# Patient Record
Sex: FEMALE | Race: WHITE | NOT HISPANIC OR LATINO | Employment: OTHER | URBAN - METROPOLITAN AREA
[De-identification: names, ages, dates, MRNs, and addresses within clinical notes are randomized per-mention and may not be internally consistent; named-entity substitution may affect disease eponyms.]

---

## 2024-02-17 ENCOUNTER — HOSPITAL ENCOUNTER (INPATIENT)
Facility: HOSPITAL | Age: 84
LOS: 4 days | Discharge: HOME | DRG: 195 | End: 2024-02-21
Attending: EMERGENCY MEDICINE | Admitting: HOSPITALIST
Payer: MEDICARE

## 2024-02-17 ENCOUNTER — APPOINTMENT (OUTPATIENT)
Dept: RADIOLOGY | Facility: HOSPITAL | Age: 84
DRG: 195 | End: 2024-02-17
Payer: MEDICARE

## 2024-02-17 DIAGNOSIS — J18.9 PNEUMONIA OF RIGHT LOWER LOBE DUE TO INFECTIOUS ORGANISM: Primary | ICD-10-CM

## 2024-02-17 LAB
ALBUMIN SERPL BCP-MCNC: 3.9 G/DL (ref 3.4–5)
ALP SERPL-CCNC: 73 U/L (ref 33–136)
ALT SERPL W P-5'-P-CCNC: 11 U/L (ref 7–45)
ANION GAP SERPL CALC-SCNC: 14 MMOL/L (ref 10–20)
APPEARANCE UR: CLEAR
AST SERPL W P-5'-P-CCNC: 11 U/L (ref 9–39)
BASOPHILS # BLD AUTO: 0.08 X10*3/UL (ref 0–0.1)
BASOPHILS NFR BLD AUTO: 0.5 %
BILIRUB SERPL-MCNC: 0.4 MG/DL (ref 0–1.2)
BILIRUB UR STRIP.AUTO-MCNC: NEGATIVE MG/DL
BUN SERPL-MCNC: 20 MG/DL (ref 6–23)
CALCIUM SERPL-MCNC: 8.9 MG/DL (ref 8.6–10.3)
CHLORIDE SERPL-SCNC: 101 MMOL/L (ref 98–107)
CO2 SERPL-SCNC: 25 MMOL/L (ref 21–32)
COLOR UR: ABNORMAL
CREAT SERPL-MCNC: 1.03 MG/DL (ref 0.5–1.05)
EGFRCR SERPLBLD CKD-EPI 2021: 54 ML/MIN/1.73M*2
EOSINOPHIL # BLD AUTO: 0.22 X10*3/UL (ref 0–0.4)
EOSINOPHIL NFR BLD AUTO: 1.3 %
ERYTHROCYTE [DISTWIDTH] IN BLOOD BY AUTOMATED COUNT: 13.8 % (ref 11.5–14.5)
FLUAV RNA RESP QL NAA+PROBE: NOT DETECTED
FLUBV RNA RESP QL NAA+PROBE: NOT DETECTED
GLUCOSE SERPL-MCNC: 223 MG/DL (ref 74–99)
GLUCOSE UR STRIP.AUTO-MCNC: ABNORMAL MG/DL
HCT VFR BLD AUTO: 38.4 % (ref 36–46)
HGB BLD-MCNC: 12.4 G/DL (ref 12–16)
IMM GRANULOCYTES # BLD AUTO: 0.38 X10*3/UL (ref 0–0.5)
IMM GRANULOCYTES NFR BLD AUTO: 2.2 % (ref 0–0.9)
KETONES UR STRIP.AUTO-MCNC: ABNORMAL MG/DL
LEUKOCYTE ESTERASE UR QL STRIP.AUTO: NEGATIVE
LIPASE SERPL-CCNC: 53 U/L (ref 9–82)
LYMPHOCYTES # BLD AUTO: 0.92 X10*3/UL (ref 0.8–3)
LYMPHOCYTES NFR BLD AUTO: 5.4 %
MCH RBC QN AUTO: 28.7 PG (ref 26–34)
MCHC RBC AUTO-ENTMCNC: 32.3 G/DL (ref 32–36)
MCV RBC AUTO: 89 FL (ref 80–100)
MONOCYTES # BLD AUTO: 0.89 X10*3/UL (ref 0.05–0.8)
MONOCYTES NFR BLD AUTO: 5.2 %
NEUTROPHILS # BLD AUTO: 14.48 X10*3/UL (ref 1.6–5.5)
NEUTROPHILS NFR BLD AUTO: 85.4 %
NITRITE UR QL STRIP.AUTO: NEGATIVE
NRBC BLD-RTO: 0 /100 WBCS (ref 0–0)
PH UR STRIP.AUTO: 7 [PH]
PLATELET # BLD AUTO: 260 X10*3/UL (ref 150–450)
POTASSIUM SERPL-SCNC: 4 MMOL/L (ref 3.5–5.3)
PROT SERPL-MCNC: 6.6 G/DL (ref 6.4–8.2)
PROT UR STRIP.AUTO-MCNC: ABNORMAL MG/DL
RBC # BLD AUTO: 4.32 X10*6/UL (ref 4–5.2)
RBC # UR STRIP.AUTO: ABNORMAL /UL
RBC #/AREA URNS AUTO: NORMAL /HPF
RSV RNA RESP QL NAA+PROBE: NOT DETECTED
SARS-COV-2 RNA RESP QL NAA+PROBE: NOT DETECTED
SODIUM SERPL-SCNC: 136 MMOL/L (ref 136–145)
SP GR UR STRIP.AUTO: 1.04
UROBILINOGEN UR STRIP.AUTO-MCNC: <2 MG/DL
WBC # BLD AUTO: 17 X10*3/UL (ref 4.4–11.3)
WBC #/AREA URNS AUTO: NORMAL /HPF

## 2024-02-17 PROCEDURE — 80053 COMPREHEN METABOLIC PANEL: CPT | Performed by: EMERGENCY MEDICINE

## 2024-02-17 PROCEDURE — 87637 SARSCOV2&INF A&B&RSV AMP PRB: CPT | Performed by: EMERGENCY MEDICINE

## 2024-02-17 PROCEDURE — 96361 HYDRATE IV INFUSION ADD-ON: CPT

## 2024-02-17 PROCEDURE — 96365 THER/PROPH/DIAG IV INF INIT: CPT

## 2024-02-17 PROCEDURE — 36415 COLL VENOUS BLD VENIPUNCTURE: CPT | Performed by: EMERGENCY MEDICINE

## 2024-02-17 PROCEDURE — 74177 CT ABD & PELVIS W/CONTRAST: CPT | Mod: FOREIGN READ | Performed by: RADIOLOGY

## 2024-02-17 PROCEDURE — 94640 AIRWAY INHALATION TREATMENT: CPT

## 2024-02-17 PROCEDURE — 85025 COMPLETE CBC W/AUTO DIFF WBC: CPT | Performed by: EMERGENCY MEDICINE

## 2024-02-17 PROCEDURE — 99223 1ST HOSP IP/OBS HIGH 75: CPT | Performed by: HOSPITALIST

## 2024-02-17 PROCEDURE — 96367 TX/PROPH/DG ADDL SEQ IV INF: CPT

## 2024-02-17 PROCEDURE — 71045 X-RAY EXAM CHEST 1 VIEW: CPT

## 2024-02-17 PROCEDURE — 1200000002 HC GENERAL ROOM WITH TELEMETRY DAILY

## 2024-02-17 PROCEDURE — 96375 TX/PRO/DX INJ NEW DRUG ADDON: CPT

## 2024-02-17 PROCEDURE — 2500000002 HC RX 250 W HCPCS SELF ADMINISTERED DRUGS (ALT 637 FOR MEDICARE OP, ALT 636 FOR OP/ED): Performed by: EMERGENCY MEDICINE

## 2024-02-17 PROCEDURE — 71045 X-RAY EXAM CHEST 1 VIEW: CPT | Mod: FOREIGN READ | Performed by: RADIOLOGY

## 2024-02-17 PROCEDURE — 99285 EMERGENCY DEPT VISIT HI MDM: CPT | Mod: 25

## 2024-02-17 PROCEDURE — 2500000005 HC RX 250 GENERAL PHARMACY W/O HCPCS: Performed by: EMERGENCY MEDICINE

## 2024-02-17 PROCEDURE — 2550000001 HC RX 255 CONTRASTS: Performed by: EMERGENCY MEDICINE

## 2024-02-17 PROCEDURE — 83690 ASSAY OF LIPASE: CPT | Performed by: EMERGENCY MEDICINE

## 2024-02-17 PROCEDURE — 81001 URINALYSIS AUTO W/SCOPE: CPT | Performed by: EMERGENCY MEDICINE

## 2024-02-17 PROCEDURE — 74177 CT ABD & PELVIS W/CONTRAST: CPT

## 2024-02-17 PROCEDURE — 2500000004 HC RX 250 GENERAL PHARMACY W/ HCPCS (ALT 636 FOR OP/ED): Performed by: EMERGENCY MEDICINE

## 2024-02-17 RX ORDER — DEXTROSE MONOHYDRATE 100 MG/ML
0.3 INJECTION, SOLUTION INTRAVENOUS ONCE AS NEEDED
Status: DISCONTINUED | OUTPATIENT
Start: 2024-02-17 | End: 2024-02-21 | Stop reason: HOSPADM

## 2024-02-17 RX ORDER — FORMOTEROL FUMARATE DIHYDRATE 20 UG/2ML
20 SOLUTION RESPIRATORY (INHALATION)
Status: DISCONTINUED | OUTPATIENT
Start: 2024-02-17 | End: 2024-02-21 | Stop reason: HOSPADM

## 2024-02-17 RX ORDER — ONDANSETRON 4 MG/1
4 TABLET, ORALLY DISINTEGRATING ORAL EVERY 8 HOURS PRN
Status: DISCONTINUED | OUTPATIENT
Start: 2024-02-17 | End: 2024-02-21 | Stop reason: HOSPADM

## 2024-02-17 RX ORDER — LANOLIN ALCOHOL/MO/W.PET/CERES
1000 CREAM (GRAM) TOPICAL DAILY
Status: DISCONTINUED | OUTPATIENT
Start: 2024-02-18 | End: 2024-02-21 | Stop reason: HOSPADM

## 2024-02-17 RX ORDER — ATORVASTATIN CALCIUM 40 MG/1
40 TABLET, FILM COATED ORAL NIGHTLY
Status: DISCONTINUED | OUTPATIENT
Start: 2024-02-17 | End: 2024-02-21 | Stop reason: HOSPADM

## 2024-02-17 RX ORDER — IPRATROPIUM BROMIDE AND ALBUTEROL SULFATE 2.5; .5 MG/3ML; MG/3ML
3 SOLUTION RESPIRATORY (INHALATION) EVERY 2 HOUR PRN
Status: DISCONTINUED | OUTPATIENT
Start: 2024-02-17 | End: 2024-02-21 | Stop reason: HOSPADM

## 2024-02-17 RX ORDER — POLYETHYLENE GLYCOL 3350 17 G/17G
17 POWDER, FOR SOLUTION ORAL DAILY
Status: DISCONTINUED | OUTPATIENT
Start: 2024-02-18 | End: 2024-02-21 | Stop reason: HOSPADM

## 2024-02-17 RX ORDER — METOPROLOL TARTRATE 25 MG/1
25 TABLET, FILM COATED ORAL 2 TIMES DAILY
Status: DISCONTINUED | OUTPATIENT
Start: 2024-02-17 | End: 2024-02-21 | Stop reason: HOSPADM

## 2024-02-17 RX ORDER — GUAIFENESIN 100 MG/5ML
200 SOLUTION ORAL EVERY 4 HOURS PRN
Status: DISCONTINUED | OUTPATIENT
Start: 2024-02-17 | End: 2024-02-21 | Stop reason: HOSPADM

## 2024-02-17 RX ORDER — AMLODIPINE BESYLATE 5 MG/1
5 TABLET ORAL DAILY
Status: DISCONTINUED | OUTPATIENT
Start: 2024-02-18 | End: 2024-02-19

## 2024-02-17 RX ORDER — IPRATROPIUM BROMIDE AND ALBUTEROL SULFATE 2.5; .5 MG/3ML; MG/3ML
3 SOLUTION RESPIRATORY (INHALATION)
Status: DISCONTINUED | OUTPATIENT
Start: 2024-02-17 | End: 2024-02-17

## 2024-02-17 RX ORDER — LORAZEPAM 1 MG/1
1 TABLET ORAL ONCE
Status: COMPLETED | OUTPATIENT
Start: 2024-02-17 | End: 2024-02-18

## 2024-02-17 RX ORDER — ONDANSETRON HYDROCHLORIDE 2 MG/ML
4 INJECTION, SOLUTION INTRAVENOUS EVERY 8 HOURS PRN
Status: DISCONTINUED | OUTPATIENT
Start: 2024-02-17 | End: 2024-02-21 | Stop reason: HOSPADM

## 2024-02-17 RX ORDER — ACETAMINOPHEN 325 MG/1
650 TABLET ORAL EVERY 4 HOURS PRN
Status: DISCONTINUED | OUTPATIENT
Start: 2024-02-17 | End: 2024-02-21 | Stop reason: HOSPADM

## 2024-02-17 RX ORDER — FLUTICASONE FUROATE AND VILANTEROL 200; 25 UG/1; UG/1
1 POWDER RESPIRATORY (INHALATION) DAILY
Status: DISCONTINUED | OUTPATIENT
Start: 2024-02-18 | End: 2024-02-17

## 2024-02-17 RX ORDER — CEFTRIAXONE 1 G/50ML
1 INJECTION, SOLUTION INTRAVENOUS EVERY 24 HOURS
Status: DISCONTINUED | OUTPATIENT
Start: 2024-02-18 | End: 2024-02-19

## 2024-02-17 RX ORDER — DEXTROSE 50 % IN WATER (D50W) INTRAVENOUS SYRINGE
25
Status: DISCONTINUED | OUTPATIENT
Start: 2024-02-17 | End: 2024-02-21 | Stop reason: HOSPADM

## 2024-02-17 RX ORDER — ESCITALOPRAM OXALATE 20 MG/1
20 TABLET ORAL DAILY
Status: DISCONTINUED | OUTPATIENT
Start: 2024-02-18 | End: 2024-02-21 | Stop reason: HOSPADM

## 2024-02-17 RX ORDER — LOSARTAN POTASSIUM 50 MG/1
100 TABLET ORAL DAILY
Status: DISCONTINUED | OUTPATIENT
Start: 2024-02-18 | End: 2024-02-21 | Stop reason: HOSPADM

## 2024-02-17 RX ORDER — TALC
3 POWDER (GRAM) TOPICAL DAILY
Status: DISCONTINUED | OUTPATIENT
Start: 2024-02-17 | End: 2024-02-21 | Stop reason: HOSPADM

## 2024-02-17 RX ORDER — INSULIN LISPRO 100 [IU]/ML
0-10 INJECTION, SOLUTION INTRAVENOUS; SUBCUTANEOUS
Status: DISCONTINUED | OUTPATIENT
Start: 2024-02-18 | End: 2024-02-21 | Stop reason: HOSPADM

## 2024-02-17 RX ORDER — FLUTICASONE FUROATE AND VILANTEROL 200; 25 UG/1; UG/1
1 POWDER RESPIRATORY (INHALATION)
Status: DISCONTINUED | OUTPATIENT
Start: 2024-02-18 | End: 2024-02-17

## 2024-02-17 RX ORDER — ONDANSETRON HYDROCHLORIDE 2 MG/ML
4 INJECTION, SOLUTION INTRAVENOUS ONCE
Status: COMPLETED | OUTPATIENT
Start: 2024-02-17 | End: 2024-02-17

## 2024-02-17 RX ORDER — CEFTRIAXONE 1 G/50ML
1 INJECTION, SOLUTION INTRAVENOUS EVERY 24 HOURS
Status: DISCONTINUED | OUTPATIENT
Start: 2024-02-18 | End: 2024-02-17

## 2024-02-17 RX ORDER — BUDESONIDE 0.5 MG/2ML
0.5 INHALANT ORAL
Status: DISCONTINUED | OUTPATIENT
Start: 2024-02-17 | End: 2024-02-21 | Stop reason: HOSPADM

## 2024-02-17 RX ORDER — SODIUM CHLORIDE 9 MG/ML
75 INJECTION, SOLUTION INTRAVENOUS CONTINUOUS
Status: DISCONTINUED | OUTPATIENT
Start: 2024-02-17 | End: 2024-02-19

## 2024-02-17 RX ADMIN — HYDROMORPHONE HYDROCHLORIDE 0.5 MG: 1 INJECTION, SOLUTION INTRAMUSCULAR; INTRAVENOUS; SUBCUTANEOUS at 16:11

## 2024-02-17 RX ADMIN — AZITHROMYCIN MONOHYDRATE 500 MG: 500 INJECTION, POWDER, LYOPHILIZED, FOR SOLUTION INTRAVENOUS at 19:38

## 2024-02-17 RX ADMIN — SODIUM CHLORIDE, POTASSIUM CHLORIDE, SODIUM LACTATE AND CALCIUM CHLORIDE 1000 ML: 600; 310; 30; 20 INJECTION, SOLUTION INTRAVENOUS at 12:17

## 2024-02-17 RX ADMIN — CEFTRIAXONE 2 G: 2 INJECTION, POWDER, FOR SOLUTION INTRAMUSCULAR; INTRAVENOUS at 18:57

## 2024-02-17 RX ADMIN — IPRATROPIUM BROMIDE AND ALBUTEROL SULFATE 3 ML: 2.5; .5 SOLUTION RESPIRATORY (INHALATION) at 18:54

## 2024-02-17 RX ADMIN — IOHEXOL 75 ML: 350 INJECTION, SOLUTION INTRAVENOUS at 14:15

## 2024-02-17 RX ADMIN — ONDANSETRON 4 MG: 2 INJECTION INTRAMUSCULAR; INTRAVENOUS at 12:18

## 2024-02-17 RX ADMIN — Medication 2 L/MIN: at 18:54

## 2024-02-17 ASSESSMENT — ENCOUNTER SYMPTOMS
RHINORRHEA: 1
CONSTITUTIONAL NEGATIVE: 1
CARDIOVASCULAR NEGATIVE: 1
MUSCULOSKELETAL NEGATIVE: 1
ALLERGIC/IMMUNOLOGIC NEGATIVE: 1
PSYCHIATRIC NEGATIVE: 1
FREQUENCY: 0
HEMATOLOGIC/LYMPHATIC NEGATIVE: 1
EYES NEGATIVE: 1
COUGH: 1
SINUS PRESSURE: 1
DYSURIA: 0
NAUSEA: 1
VOMITING: 1
NEUROLOGICAL NEGATIVE: 1

## 2024-02-17 ASSESSMENT — COLUMBIA-SUICIDE SEVERITY RATING SCALE - C-SSRS
2. HAVE YOU ACTUALLY HAD ANY THOUGHTS OF KILLING YOURSELF?: NO
6. HAVE YOU EVER DONE ANYTHING, STARTED TO DO ANYTHING, OR PREPARED TO DO ANYTHING TO END YOUR LIFE?: NO
1. IN THE PAST MONTH, HAVE YOU WISHED YOU WERE DEAD OR WISHED YOU COULD GO TO SLEEP AND NOT WAKE UP?: NO

## 2024-02-17 ASSESSMENT — PAIN SCALES - GENERAL: PAINLEVEL_OUTOF10: 0 - NO PAIN

## 2024-02-17 ASSESSMENT — PAIN - FUNCTIONAL ASSESSMENT: PAIN_FUNCTIONAL_ASSESSMENT: 0-10

## 2024-02-17 NOTE — ED PROVIDER NOTES
HPI   Chief Complaint   Patient presents with    Flu Symptoms       The patient is an 83 year old female PMHx diverticulitis presenting with cough, abdominal pain, nausea, subjective fevers/ chills. Notes symptoms over the last 2-3 days. Notes no sick contacts. No recent travel. Symptoms not improved or worsened by any notable factor. Notes generalized body aches and fatigue as well. Notes a history of diverticulitis with similar symptoms in the past. Denies any vomiting, diarrhea, dark black or bloody stools.                           Hope Coma Scale Score: 15                     Patient History   Past Medical History:   Diagnosis Date    Disease of tongue, unspecified     Disorder of tongue    Diverticulitis of intestine, part unspecified, without perforation or abscess without bleeding 12/01/2016    Acute diverticulitis    Encounter for follow-up examination after completed treatment for malignant neoplasm 04/30/2015    Encounter for follow-up surveillance of breast cancer    Malignant neoplasm of lower-outer quadrant of unspecified female breast (CMS/HCC) 04/30/2019    Malignant neoplasm of lower-outer quadrant of female breast    Other conditions influencing health status     Malignant Female Breast Neoplasm Of The Upper Outer Quadrant    Personal history of diseases of the blood and blood-forming organs and certain disorders involving the immune mechanism 02/05/2015    History of iron deficiency anemia    Personal history of diseases of the skin and subcutaneous tissue     History of cyst of breast    Personal history of other (healed) physical injury and trauma 02/18/2014    History of whiplash injury to neck    Personal history of other diseases of the respiratory system 05/11/2015    History of sinusitis    Personal history of other endocrine, nutritional and metabolic disease     History of diabetes mellitus     Past Surgical History:   Procedure Laterality Date    BREAST LUMPECTOMY  08/28/2013    Left  Breast Lumpectomy    CATARACT EXTRACTION  02/18/2014    Cataract Extraction    GALLBLADDER SURGERY  11/04/2013    Gallbladder Surgery    OTHER SURGICAL HISTORY  08/28/2013    Bx Breast Percutan Needle Core Use Imag Guide (Stereotactic)    OTHER SURGICAL HISTORY  09/18/2014    Oophorectomy For Ectopic Pregnancy Right Ovary     No family history on file.  Social History     Tobacco Use    Smoking status: Not on file    Smokeless tobacco: Not on file   Substance Use Topics    Alcohol use: Not on file    Drug use: Not on file       Physical Exam   ED Triage Vitals   Temperature Heart Rate Respirations BP   02/17/24 1127 02/17/24 1127 02/17/24 1127 02/17/24 1127   36.9 °C (98.4 °F) 90 16 (!) 199/69      Pulse Ox Temp Source Heart Rate Source Patient Position   02/17/24 1127 02/17/24 1127 02/17/24 1151 --   95 % Oral Monitor       BP Location FiO2 (%)     -- --             Physical Exam  Constitutional:       General: She is not in acute distress.     Appearance: Normal appearance. She is ill-appearing. She is not toxic-appearing or diaphoretic.   HENT:      Head: Normocephalic.      Mouth/Throat:      Mouth: Mucous membranes are moist.      Tongue: No lesions. Tongue does not deviate from midline.      Palate: No mass and lesions.   Eyes:      General: Lids are normal. No visual field deficit.     Extraocular Movements: Extraocular movements intact.      Right eye: Normal extraocular motion and no nystagmus.      Left eye: Normal extraocular motion and no nystagmus.      Pupils: Pupils are equal, round, and reactive to light. Pupils are equal.   Cardiovascular:      Pulses:           Radial pulses are 2+ on the right side and 2+ on the left side.   Pulmonary:      Effort: Pulmonary effort is normal. No tachypnea or bradypnea.      Breath sounds: Normal air entry. Examination of the right-lower field reveals rhonchi. Examination of the left-lower field reveals rhonchi. Rhonchi present. No decreased breath sounds, wheezing  or rales.   Abdominal:      General: Abdomen is flat. Bowel sounds are normal.      Palpations: Abdomen is soft.      Tenderness: There is abdominal tenderness in the right lower quadrant, suprapubic area, left upper quadrant and left lower quadrant. There is no right CVA tenderness or left CVA tenderness.   Musculoskeletal:      Cervical back: Full passive range of motion without pain, normal range of motion and neck supple.   Neurological:      General: No focal deficit present.      Mental Status: She is alert.      GCS: GCS eye subscore is 4. GCS verbal subscore is 5. GCS motor subscore is 6.      Cranial Nerves: Cranial nerves 2-12 are intact. No cranial nerve deficit, dysarthria or facial asymmetry.      Sensory: Sensation is intact.      Motor: Motor function is intact.   Psychiatric:         Behavior: Behavior is cooperative.         ED Course & MDM   Diagnoses as of 02/24/24 2113   Pneumonia of right lower lobe due to infectious organism       Medical Decision Making  Patient is an 83 year old female presenting with generalized fatigue/ malaise/ cough with subjective fevers and chills over the last several days. Has a cough raising concern for respiratory infectious process however also with abdominal tenderness concerning for diverticulitis, colitis, hepatobiliary pathology. Will assess with labs, chest x-ray, CT abdomen and pelvis, will obtain UA to assess for renal pathology as well. Will treat with IV analgesics/ fluids. Disposition pending labs and imaging.    Workup remarkable for leukocytosis on CBC, CT showing no acute intraabdominal process but does have focal infiltrate of right lower lobe concerning for pneumonia. On reassessment patient mildly hypoxemic to 92% on RA, improved with 2L O2 by nasal cannula. Cultures obtained, IV abx initiated, patient admitted to hospitalist for further evaluation and management. Admitted in stable condition.        Procedure  Procedures     Malvin Connolly,  MD  02/24/24 7614

## 2024-02-17 NOTE — ED TRIAGE NOTES
Pt coming in today because she woke up not feeling feel. She is complaining of generalized weakness and being incredible nauseous with it. Vitals are stable and no pain at this time.

## 2024-02-18 LAB
ANION GAP SERPL CALC-SCNC: 12 MMOL/L (ref 10–20)
BASOPHILS # BLD AUTO: 0.06 X10*3/UL (ref 0–0.1)
BASOPHILS NFR BLD AUTO: 0.4 %
BUN SERPL-MCNC: 25 MG/DL (ref 6–23)
CALCIUM SERPL-MCNC: 7.8 MG/DL (ref 8.6–10.3)
CHLORIDE SERPL-SCNC: 102 MMOL/L (ref 98–107)
CO2 SERPL-SCNC: 26 MMOL/L (ref 21–32)
CREAT SERPL-MCNC: 1.24 MG/DL (ref 0.5–1.05)
EGFRCR SERPLBLD CKD-EPI 2021: 43 ML/MIN/1.73M*2
EOSINOPHIL # BLD AUTO: 0.07 X10*3/UL (ref 0–0.4)
EOSINOPHIL NFR BLD AUTO: 0.5 %
ERYTHROCYTE [DISTWIDTH] IN BLOOD BY AUTOMATED COUNT: 14.1 % (ref 11.5–14.5)
EST. AVERAGE GLUCOSE BLD GHB EST-MCNC: 180 MG/DL
GLUCOSE BLD MANUAL STRIP-MCNC: 114 MG/DL (ref 74–99)
GLUCOSE BLD MANUAL STRIP-MCNC: 137 MG/DL (ref 74–99)
GLUCOSE BLD MANUAL STRIP-MCNC: 195 MG/DL (ref 74–99)
GLUCOSE BLD MANUAL STRIP-MCNC: 202 MG/DL (ref 74–99)
GLUCOSE SERPL-MCNC: 118 MG/DL (ref 74–99)
HBA1C MFR BLD: 7.9 %
HCT VFR BLD AUTO: 31.3 % (ref 36–46)
HGB BLD-MCNC: 10 G/DL (ref 12–16)
IMM GRANULOCYTES # BLD AUTO: 0.09 X10*3/UL (ref 0–0.5)
IMM GRANULOCYTES NFR BLD AUTO: 0.6 % (ref 0–0.9)
LYMPHOCYTES # BLD AUTO: 2.36 X10*3/UL (ref 0.8–3)
LYMPHOCYTES NFR BLD AUTO: 15.5 %
MAGNESIUM SERPL-MCNC: 1.3 MG/DL (ref 1.6–2.4)
MCH RBC QN AUTO: 28.1 PG (ref 26–34)
MCHC RBC AUTO-ENTMCNC: 31.9 G/DL (ref 32–36)
MCV RBC AUTO: 88 FL (ref 80–100)
MONOCYTES # BLD AUTO: 1.37 X10*3/UL (ref 0.05–0.8)
MONOCYTES NFR BLD AUTO: 9 %
NEUTROPHILS # BLD AUTO: 11.28 X10*3/UL (ref 1.6–5.5)
NEUTROPHILS NFR BLD AUTO: 74 %
NRBC BLD-RTO: 0 /100 WBCS (ref 0–0)
PLATELET # BLD AUTO: 214 X10*3/UL (ref 150–450)
POTASSIUM SERPL-SCNC: 4.2 MMOL/L (ref 3.5–5.3)
RBC # BLD AUTO: 3.56 X10*6/UL (ref 4–5.2)
SODIUM SERPL-SCNC: 136 MMOL/L (ref 136–145)
TSH SERPL-ACNC: 1.41 MIU/L (ref 0.44–3.98)
WBC # BLD AUTO: 15.2 X10*3/UL (ref 4.4–11.3)

## 2024-02-18 PROCEDURE — 84443 ASSAY THYROID STIM HORMONE: CPT | Performed by: HOSPITALIST

## 2024-02-18 PROCEDURE — 85025 COMPLETE CBC W/AUTO DIFF WBC: CPT | Performed by: HOSPITALIST

## 2024-02-18 PROCEDURE — 82947 ASSAY GLUCOSE BLOOD QUANT: CPT

## 2024-02-18 PROCEDURE — 2500000002 HC RX 250 W HCPCS SELF ADMINISTERED DRUGS (ALT 637 FOR MEDICARE OP, ALT 636 FOR OP/ED): Performed by: HOSPITALIST

## 2024-02-18 PROCEDURE — 80048 BASIC METABOLIC PNL TOTAL CA: CPT | Performed by: HOSPITALIST

## 2024-02-18 PROCEDURE — 83036 HEMOGLOBIN GLYCOSYLATED A1C: CPT | Mod: AHULAB | Performed by: HOSPITALIST

## 2024-02-18 PROCEDURE — 99232 SBSQ HOSP IP/OBS MODERATE 35: CPT | Performed by: INTERNAL MEDICINE

## 2024-02-18 PROCEDURE — 1200000002 HC GENERAL ROOM WITH TELEMETRY DAILY

## 2024-02-18 PROCEDURE — 2500000001 HC RX 250 WO HCPCS SELF ADMINISTERED DRUGS (ALT 637 FOR MEDICARE OP): Performed by: HOSPITALIST

## 2024-02-18 PROCEDURE — 94640 AIRWAY INHALATION TREATMENT: CPT | Mod: MUE

## 2024-02-18 PROCEDURE — 36415 COLL VENOUS BLD VENIPUNCTURE: CPT | Performed by: HOSPITALIST

## 2024-02-18 PROCEDURE — 2500000004 HC RX 250 GENERAL PHARMACY W/ HCPCS (ALT 636 FOR OP/ED): Performed by: HOSPITALIST

## 2024-02-18 PROCEDURE — 94640 AIRWAY INHALATION TREATMENT: CPT

## 2024-02-18 PROCEDURE — 83735 ASSAY OF MAGNESIUM: CPT | Performed by: HOSPITALIST

## 2024-02-18 PROCEDURE — 2500000002 HC RX 250 W HCPCS SELF ADMINISTERED DRUGS (ALT 637 FOR MEDICARE OP, ALT 636 FOR OP/ED): Mod: MUE | Performed by: PEDIATRICS

## 2024-02-18 RX ORDER — GUAIFENESIN 100 MG/5ML
200 SOLUTION ORAL EVERY 4 HOURS PRN
Status: DISCONTINUED | OUTPATIENT
Start: 2024-02-18 | End: 2024-02-21 | Stop reason: HOSPADM

## 2024-02-18 RX ORDER — BENZONATATE 100 MG/1
100 CAPSULE ORAL 3 TIMES DAILY PRN
Status: DISCONTINUED | OUTPATIENT
Start: 2024-02-18 | End: 2024-02-21 | Stop reason: HOSPADM

## 2024-02-18 RX ADMIN — METOPROLOL TARTRATE 25 MG: 25 TABLET, FILM COATED ORAL at 20:26

## 2024-02-18 RX ADMIN — LORAZEPAM 1 MG: 1 TABLET ORAL at 00:21

## 2024-02-18 RX ADMIN — ATORVASTATIN CALCIUM 40 MG: 40 TABLET, FILM COATED ORAL at 00:23

## 2024-02-18 RX ADMIN — INSULIN LISPRO 2 UNITS: 100 INJECTION, SOLUTION INTRAVENOUS; SUBCUTANEOUS at 13:08

## 2024-02-18 RX ADMIN — APIXABAN 5 MG: 5 TABLET, FILM COATED ORAL at 13:08

## 2024-02-18 RX ADMIN — INSULIN LISPRO 4 UNITS: 100 INJECTION, SOLUTION INTRAVENOUS; SUBCUTANEOUS at 17:16

## 2024-02-18 RX ADMIN — CYANOCOBALAMIN TAB 1000 MCG 1000 MCG: 1000 TAB at 08:04

## 2024-02-18 RX ADMIN — BUDESONIDE INHALATION 0.5 MG: 0.5 SUSPENSION RESPIRATORY (INHALATION) at 07:28

## 2024-02-18 RX ADMIN — ATORVASTATIN CALCIUM 40 MG: 40 TABLET, FILM COATED ORAL at 20:26

## 2024-02-18 RX ADMIN — FORMOTEROL FUMARATE DIHYDRATE 20 MCG: 20 SOLUTION RESPIRATORY (INHALATION) at 07:28

## 2024-02-18 RX ADMIN — METOPROLOL TARTRATE 25 MG: 25 TABLET, FILM COATED ORAL at 00:21

## 2024-02-18 RX ADMIN — AMLODIPINE BESYLATE 5 MG: 5 TABLET ORAL at 08:04

## 2024-02-18 RX ADMIN — METOPROLOL TARTRATE 25 MG: 25 TABLET, FILM COATED ORAL at 08:04

## 2024-02-18 RX ADMIN — SODIUM CHLORIDE 75 ML/HR: 9 INJECTION, SOLUTION INTRAVENOUS at 00:21

## 2024-02-18 RX ADMIN — SODIUM CHLORIDE 75 ML/HR: 9 INJECTION, SOLUTION INTRAVENOUS at 14:01

## 2024-02-18 RX ADMIN — FORMOTEROL FUMARATE DIHYDRATE 20 MCG: 20 SOLUTION RESPIRATORY (INHALATION) at 19:00

## 2024-02-18 RX ADMIN — AZITHROMYCIN MONOHYDRATE 500 MG: 500 INJECTION, POWDER, LYOPHILIZED, FOR SOLUTION INTRAVENOUS at 20:26

## 2024-02-18 RX ADMIN — LOSARTAN POTASSIUM 100 MG: 50 TABLET, FILM COATED ORAL at 08:04

## 2024-02-18 RX ADMIN — FORMOTEROL FUMARATE DIHYDRATE 20 MCG: 20 SOLUTION RESPIRATORY (INHALATION) at 00:07

## 2024-02-18 RX ADMIN — BUDESONIDE INHALATION 0.5 MG: 0.5 SUSPENSION RESPIRATORY (INHALATION) at 00:07

## 2024-02-18 RX ADMIN — ESCITALOPRAM OXALATE 20 MG: 20 TABLET, FILM COATED ORAL at 08:04

## 2024-02-18 RX ADMIN — BUDESONIDE INHALATION 0.5 MG: 0.5 SUSPENSION RESPIRATORY (INHALATION) at 19:00

## 2024-02-18 RX ADMIN — CEFTRIAXONE SODIUM 1 G: 1 INJECTION, SOLUTION INTRAVENOUS at 19:38

## 2024-02-18 RX ADMIN — APIXABAN 5 MG: 5 TABLET, FILM COATED ORAL at 00:21

## 2024-02-18 SDOH — SOCIAL STABILITY: SOCIAL INSECURITY: HAS ANYONE EVER THREATENED TO HURT YOUR FAMILY OR YOUR PETS?: NO

## 2024-02-18 SDOH — SOCIAL STABILITY: SOCIAL INSECURITY: DOES ANYONE TRY TO KEEP YOU FROM HAVING/CONTACTING OTHER FRIENDS OR DOING THINGS OUTSIDE YOUR HOME?: NO

## 2024-02-18 SDOH — SOCIAL STABILITY: SOCIAL INSECURITY: ABUSE: ADULT

## 2024-02-18 SDOH — SOCIAL STABILITY: SOCIAL INSECURITY: ARE YOU OR HAVE YOU BEEN THREATENED OR ABUSED PHYSICALLY, EMOTIONALLY, OR SEXUALLY BY ANYONE?: NO

## 2024-02-18 SDOH — SOCIAL STABILITY: SOCIAL INSECURITY: WERE YOU ABLE TO COMPLETE ALL THE BEHAVIORAL HEALTH SCREENINGS?: YES

## 2024-02-18 SDOH — SOCIAL STABILITY: SOCIAL INSECURITY: ARE THERE ANY APPARENT SIGNS OF INJURIES/BEHAVIORS THAT COULD BE RELATED TO ABUSE/NEGLECT?: NO

## 2024-02-18 SDOH — SOCIAL STABILITY: SOCIAL INSECURITY: DO YOU FEEL ANYONE HAS EXPLOITED OR TAKEN ADVANTAGE OF YOU FINANCIALLY OR OF YOUR PERSONAL PROPERTY?: NO

## 2024-02-18 SDOH — SOCIAL STABILITY: SOCIAL INSECURITY: HAVE YOU HAD THOUGHTS OF HARMING ANYONE ELSE?: NO

## 2024-02-18 SDOH — SOCIAL STABILITY: SOCIAL INSECURITY: DO YOU FEEL UNSAFE GOING BACK TO THE PLACE WHERE YOU ARE LIVING?: NO

## 2024-02-18 ASSESSMENT — LIFESTYLE VARIABLES
AUDIT-C TOTAL SCORE: 0
HOW OFTEN DO YOU HAVE 6 OR MORE DRINKS ON ONE OCCASION: NEVER
SKIP TO QUESTIONS 9-10: 1
HOW OFTEN DO YOU HAVE A DRINK CONTAINING ALCOHOL: NEVER
AUDIT-C TOTAL SCORE: 0
HOW MANY STANDARD DRINKS CONTAINING ALCOHOL DO YOU HAVE ON A TYPICAL DAY: PATIENT DOES NOT DRINK

## 2024-02-18 ASSESSMENT — COGNITIVE AND FUNCTIONAL STATUS - GENERAL
DAILY ACTIVITIY SCORE: 24
MOBILITY SCORE: 24
MOBILITY SCORE: 24
DAILY ACTIVITIY SCORE: 24
PATIENT BASELINE BEDBOUND: NO
MOBILITY SCORE: 24
DAILY ACTIVITIY SCORE: 24

## 2024-02-18 ASSESSMENT — ACTIVITIES OF DAILY LIVING (ADL)
GROOMING: INDEPENDENT
WALKS IN HOME: INDEPENDENT
BATHING: INDEPENDENT
FEEDING YOURSELF: INDEPENDENT
ADEQUATE_TO_COMPLETE_ADL: YES
HEARING - RIGHT EAR: FUNCTIONAL
TOILETING: INDEPENDENT
PATIENT'S MEMORY ADEQUATE TO SAFELY COMPLETE DAILY ACTIVITIES?: YES
HEARING - LEFT EAR: FUNCTIONAL
DRESSING YOURSELF: INDEPENDENT
JUDGMENT_ADEQUATE_SAFELY_COMPLETE_DAILY_ACTIVITIES: YES
LACK_OF_TRANSPORTATION: NO

## 2024-02-18 ASSESSMENT — PATIENT HEALTH QUESTIONNAIRE - PHQ9
SUM OF ALL RESPONSES TO PHQ9 QUESTIONS 1 & 2: 0
2. FEELING DOWN, DEPRESSED OR HOPELESS: NOT AT ALL
1. LITTLE INTEREST OR PLEASURE IN DOING THINGS: NOT AT ALL

## 2024-02-18 ASSESSMENT — PAIN SCALES - GENERAL
PAINLEVEL_OUTOF10: 0 - NO PAIN

## 2024-02-18 NOTE — NURSING NOTE
Pt requesting 10 mg Glimiperide and 850 mg Metformin. Per Dr Perla, she does not recommend taking these medications in the hospital due to chance of counteracting with other medications and potentially needed CT scans.      today

## 2024-02-18 NOTE — PROGRESS NOTES
"Caitlyn Juan is a 83 y.o. female on day 1 of admission presenting with Pneumonia of right lower lobe due to infectious organism.    Subjective     Doing well room air, BELEM at 1.2, white count 15,000       Objective     Physical Exam  Constitutional:       Appearance: Normal appearance.   HENT:      Head: Normocephalic.      Nose: Nose normal.      Mouth/Throat:      Mouth: Mucous membranes are dry.      Pharynx: Oropharynx is clear.   Cardiovascular:      Rate and Rhythm: Normal rate.   Pulmonary:      Effort: Pulmonary effort is normal.      Breath sounds: Normal breath sounds.   Abdominal:      General: Abdomen is flat. Bowel sounds are normal.      Palpations: Abdomen is soft.   Skin:     General: Skin is warm and dry.      Capillary Refill: Capillary refill takes less than 2 seconds.   Neurological:      General: No focal deficit present.      Mental Status: She is alert.         Last Recorded Vitals  Blood pressure 119/52, pulse 72, temperature 36.5 °C (97.7 °F), temperature source Temporal, resp. rate 16, height 1.626 m (5' 4\"), weight 69.9 kg (154 lb), SpO2 92 %.  Intake/Output last 3 Shifts:  I/O last 3 completed shifts:  In: 720 (10.3 mL/kg) [P.O.:720]  Out: - (0 mL/kg)   Weight: 69.9 kg     Relevant Results  Results for orders placed or performed during the hospital encounter of 02/17/24 (from the past 24 hour(s))   Sars-CoV-2 and Influenza A/B PCR   Result Value Ref Range    Flu A Result Not Detected Not Detected    Flu B Result Not Detected Not Detected    Coronavirus 2019, PCR Not Detected Not Detected   RSV PCR   Result Value Ref Range    RSV PCR Not Detected Not Detected   CBC and Auto Differential   Result Value Ref Range    WBC 17.0 (H) 4.4 - 11.3 x10*3/uL    nRBC 0.0 0.0 - 0.0 /100 WBCs    RBC 4.32 4.00 - 5.20 x10*6/uL    Hemoglobin 12.4 12.0 - 16.0 g/dL    Hematocrit 38.4 36.0 - 46.0 %    MCV 89 80 - 100 fL    MCH 28.7 26.0 - 34.0 pg    MCHC 32.3 32.0 - 36.0 g/dL    RDW 13.8 11.5 - 14.5 %    " Platelets 260 150 - 450 x10*3/uL    Neutrophils % 85.4 40.0 - 80.0 %    Immature Granulocytes %, Automated 2.2 (H) 0.0 - 0.9 %    Lymphocytes % 5.4 13.0 - 44.0 %    Monocytes % 5.2 2.0 - 10.0 %    Eosinophils % 1.3 0.0 - 6.0 %    Basophils % 0.5 0.0 - 2.0 %    Neutrophils Absolute 14.48 (H) 1.60 - 5.50 x10*3/uL    Immature Granulocytes Absolute, Automated 0.38 0.00 - 0.50 x10*3/uL    Lymphocytes Absolute 0.92 0.80 - 3.00 x10*3/uL    Monocytes Absolute 0.89 (H) 0.05 - 0.80 x10*3/uL    Eosinophils Absolute 0.22 0.00 - 0.40 x10*3/uL    Basophils Absolute 0.08 0.00 - 0.10 x10*3/uL   Comprehensive metabolic panel   Result Value Ref Range    Glucose 223 (H) 74 - 99 mg/dL    Sodium 136 136 - 145 mmol/L    Potassium 4.0 3.5 - 5.3 mmol/L    Chloride 101 98 - 107 mmol/L    Bicarbonate 25 21 - 32 mmol/L    Anion Gap 14 10 - 20 mmol/L    Urea Nitrogen 20 6 - 23 mg/dL    Creatinine 1.03 0.50 - 1.05 mg/dL    eGFR 54 (L) >60 mL/min/1.73m*2    Calcium 8.9 8.6 - 10.3 mg/dL    Albumin 3.9 3.4 - 5.0 g/dL    Alkaline Phosphatase 73 33 - 136 U/L    Total Protein 6.6 6.4 - 8.2 g/dL    AST 11 9 - 39 U/L    Bilirubin, Total 0.4 0.0 - 1.2 mg/dL    ALT 11 7 - 45 U/L   Lipase   Result Value Ref Range    Lipase 53 9 - 82 U/L   Urinalysis with Reflex Microscopic   Result Value Ref Range    Color, Urine Straw Straw, Yellow    Appearance, Urine Clear Clear    Specific Gravity, Urine 1.040 (N) 1.005 - 1.035    pH, Urine 7.0 5.0, 5.5, 6.0, 6.5, 7.0, 7.5, 8.0    Protein, Urine >=500 (3+) (N) NEGATIVE mg/dL    Glucose, Urine 50 (1+) (A) NEGATIVE mg/dL    Blood, Urine SMALL (1+) (A) NEGATIVE    Ketones, Urine 5 (TRACE) (A) NEGATIVE mg/dL    Bilirubin, Urine NEGATIVE NEGATIVE    Urobilinogen, Urine <2.0 <2.0 mg/dL    Nitrite, Urine NEGATIVE NEGATIVE    Leukocyte Esterase, Urine NEGATIVE NEGATIVE   Microscopic Only, Urine   Result Value Ref Range    WBC, Urine 1-5 1-5, NONE /HPF    RBC, Urine 3-5 NONE, 1-2, 3-5 /HPF   Hemoglobin A1C   Result Value Ref  Range    Hemoglobin A1C 7.9 (H) see below %    Estimated Average Glucose 180 Not Established mg/dL   TSH   Result Value Ref Range    Thyroid Stimulating Hormone 1.41 0.44 - 3.98 mIU/L   Basic metabolic panel   Result Value Ref Range    Glucose 118 (H) 74 - 99 mg/dL    Sodium 136 136 - 145 mmol/L    Potassium 4.2 3.5 - 5.3 mmol/L    Chloride 102 98 - 107 mmol/L    Bicarbonate 26 21 - 32 mmol/L    Anion Gap 12 10 - 20 mmol/L    Urea Nitrogen 25 (H) 6 - 23 mg/dL    Creatinine 1.24 (H) 0.50 - 1.05 mg/dL    eGFR 43 (L) >60 mL/min/1.73m*2    Calcium 7.8 (L) 8.6 - 10.3 mg/dL   Magnesium   Result Value Ref Range    Magnesium 1.30 (L) 1.60 - 2.40 mg/dL   CBC and Auto Differential   Result Value Ref Range    WBC 15.2 (H) 4.4 - 11.3 x10*3/uL    nRBC 0.0 0.0 - 0.0 /100 WBCs    RBC 3.56 (L) 4.00 - 5.20 x10*6/uL    Hemoglobin 10.0 (L) 12.0 - 16.0 g/dL    Hematocrit 31.3 (L) 36.0 - 46.0 %    MCV 88 80 - 100 fL    MCH 28.1 26.0 - 34.0 pg    MCHC 31.9 (L) 32.0 - 36.0 g/dL    RDW 14.1 11.5 - 14.5 %    Platelets 214 150 - 450 x10*3/uL    Neutrophils % 74.0 40.0 - 80.0 %    Immature Granulocytes %, Automated 0.6 0.0 - 0.9 %    Lymphocytes % 15.5 13.0 - 44.0 %    Monocytes % 9.0 2.0 - 10.0 %    Eosinophils % 0.5 0.0 - 6.0 %    Basophils % 0.4 0.0 - 2.0 %    Neutrophils Absolute 11.28 (H) 1.60 - 5.50 x10*3/uL    Immature Granulocytes Absolute, Automated 0.09 0.00 - 0.50 x10*3/uL    Lymphocytes Absolute 2.36 0.80 - 3.00 x10*3/uL    Monocytes Absolute 1.37 (H) 0.05 - 0.80 x10*3/uL    Eosinophils Absolute 0.07 0.00 - 0.40 x10*3/uL    Basophils Absolute 0.06 0.00 - 0.10 x10*3/uL   POCT GLUCOSE   Result Value Ref Range    POCT Glucose 137 (H) 74 - 99 mg/dL   POCT GLUCOSE   Result Value Ref Range    POCT Glucose 195 (H) 74 - 99 mg/dL       Imaging Results  Cxr:  IMPRESSION:  No acute process    Ct abd/pelvis:    IMPRESSION:  Diverticulosis without diverticulitis.  There is a large amount of  stool in the rectum.  Right lower lobe infiltrate.   This does have a nodular component and  follow-up after treatment to exclude a pulmonary nodule is  recommended.  Nonobstructing left renal calculus.  There is thickening of the wall  of the left renal pelvis and correlation with the patient's urinalysis  to exclude infection is recommended.    Medications:  amLODIPine, 5 mg, oral, Daily  apixaban, 5 mg, oral, q12h  atorvastatin, 40 mg, oral, Nightly  azithromycin, 500 mg, intravenous, q24h  budesonide, 0.5 mg, nebulization, BID  cefTRIAXone, 1 g, intravenous, q24h  cyanocobalamin, 1,000 mcg, oral, Daily  escitalopram, 20 mg, oral, Daily  formoterol, 20 mcg, nebulization, BID  HYDROmorphone, 0.2 mg, intravenous, Once  insulin lispro, 0-10 Units, subcutaneous, TID with meals  losartan, 100 mg, oral, Daily  melatonin, 3 mg, oral, Daily  metoprolol tartrate, 25 mg, oral, BID  polyethylene glycol, 17 g, oral, Daily       PRN medications: acetaminophen, acetaminophen, benzonatate, dextrose 10 % in water (D10W), dextrose, glucagon, guaiFENesin, guaiFENesin, ipratropium-albuteroL, ondansetron ODT **OR** ondansetron, oxygen     Assessment/Plan   RLL PNA  - continue azithromycin and iv ceftriaxone    2.PAF  -on eliquis    3. BELEM  -pre renal encouarge oral hydration    4. DMII  -SSI  DVT Prophylaxis:  Parris Atkinson MD  [facility]

## 2024-02-18 NOTE — H&P
"History Of Present Illness  Caitlyn Juan is a 83 y.o. female with a PMH of breast cancer, DM2, HTN, HLD, hypothyroidism, PAF (Eliquis), vit B12 deficiency, depression and anxiety presenting with cough, upper respiratory symptoms.  Cough has been ongoing for the past 2 months. She attributed it to allergies. Cough is productive of clear sputum.   +fever and shaking chills  +N/V today, with associated LLQ abdominal pain.   She believes her BM have been normal, but \"runny\".   Denies dysuria or inc urinary frequency.     Work up in the ED showed WBC ct 17, afebrile, saturating well on RA.   CT A/P showed RLL infiltrate, lg amt of stool in rectum.      Past Medical History  Past Medical History:   Diagnosis Date    Disease of tongue, unspecified     Disorder of tongue    Diverticulitis of intestine, part unspecified, without perforation or abscess without bleeding 12/01/2016    Acute diverticulitis    Encounter for follow-up examination after completed treatment for malignant neoplasm 04/30/2015    Encounter for follow-up surveillance of breast cancer    Malignant neoplasm of lower-outer quadrant of unspecified female breast (CMS/HCC) 04/30/2019    Malignant neoplasm of lower-outer quadrant of female breast    Other conditions influencing health status     Malignant Female Breast Neoplasm Of The Upper Outer Quadrant    Personal history of diseases of the blood and blood-forming organs and certain disorders involving the immune mechanism 02/05/2015    History of iron deficiency anemia    Personal history of diseases of the skin and subcutaneous tissue     History of cyst of breast    Personal history of other (healed) physical injury and trauma 02/18/2014    History of whiplash injury to neck    Personal history of other diseases of the respiratory system 05/11/2015    History of sinusitis    Personal history of other endocrine, nutritional and metabolic disease     History of diabetes mellitus       Surgical " History  Past Surgical History:   Procedure Laterality Date    BREAST LUMPECTOMY  08/28/2013    Left Breast Lumpectomy    CATARACT EXTRACTION  02/18/2014    Cataract Extraction    GALLBLADDER SURGERY  11/04/2013    Gallbladder Surgery    OTHER SURGICAL HISTORY  08/28/2013    Bx Breast Percutan Needle Core Use Imag Guide (Stereotactic)    OTHER SURGICAL HISTORY  09/18/2014    Oophorectomy For Ectopic Pregnancy Right Ovary        Social History  She has no history on file for tobacco use, alcohol use, and drug use.    Family History  No family history on file.     Allergies  Bactrim [sulfamethoxazole-trimethoprim] and Flagyl [metronidazole]    Review of Systems   Constitutional: Negative.    HENT:  Positive for postnasal drip, rhinorrhea and sinus pressure.    Eyes: Negative.    Respiratory:  Positive for cough.    Cardiovascular: Negative.    Gastrointestinal:  Positive for nausea and vomiting.   Genitourinary: Negative.  Negative for dysuria and frequency.   Musculoskeletal: Negative.    Skin: Negative.    Allergic/Immunologic: Negative.    Neurological: Negative.    Hematological: Negative.    Psychiatric/Behavioral: Negative.          Physical Exam  Vitals reviewed.   Constitutional:       Appearance: Normal appearance.      Comments: Pleasant elderly female, no distress, alert, oriented. Coughs frequently. Answers questions appropriately.      HENT:      Head: Normocephalic and atraumatic.      Mouth/Throat:      Mouth: Mucous membranes are moist.   Eyes:      Extraocular Movements: Extraocular movements intact.      Conjunctiva/sclera: Conjunctivae normal.      Pupils: Pupils are equal, round, and reactive to light.   Cardiovascular:      Rate and Rhythm: Normal rate and regular rhythm.      Pulses: Normal pulses.      Heart sounds: Normal heart sounds.      Comments: Regular rhythm  Pulmonary:      Effort: Pulmonary effort is normal.      Breath sounds: Normal breath sounds.      Comments: Decreased breath  "sounds left base, otherwise clear to auscultation.   Abdominal:      General: Abdomen is flat. Bowel sounds are normal.      Palpations: Abdomen is soft.      Tenderness: There is abdominal tenderness.      Comments: Tender to palpation LLQ   Musculoskeletal:         General: Normal range of motion.   Skin:     General: Skin is warm and dry.   Neurological:      General: No focal deficit present.      Mental Status: She is alert and oriented to person, place, and time.   Psychiatric:         Mood and Affect: Mood normal.         Behavior: Behavior normal.         Thought Content: Thought content normal.         Judgment: Judgment normal.          Last Recorded Vitals  Blood pressure (!) 133/47, pulse 70, temperature 36.9 °C (98.4 °F), temperature source Oral, resp. rate 18, height 1.626 m (5' 4\"), weight 69.9 kg (154 lb), SpO2 97 %.    Relevant Results        Results for orders placed or performed during the hospital encounter of 02/17/24 (from the past 24 hour(s))   Sars-CoV-2 and Influenza A/B PCR   Result Value Ref Range    Flu A Result Not Detected Not Detected    Flu B Result Not Detected Not Detected    Coronavirus 2019, PCR Not Detected Not Detected   RSV PCR   Result Value Ref Range    RSV PCR Not Detected Not Detected   CBC and Auto Differential   Result Value Ref Range    WBC 17.0 (H) 4.4 - 11.3 x10*3/uL    nRBC 0.0 0.0 - 0.0 /100 WBCs    RBC 4.32 4.00 - 5.20 x10*6/uL    Hemoglobin 12.4 12.0 - 16.0 g/dL    Hematocrit 38.4 36.0 - 46.0 %    MCV 89 80 - 100 fL    MCH 28.7 26.0 - 34.0 pg    MCHC 32.3 32.0 - 36.0 g/dL    RDW 13.8 11.5 - 14.5 %    Platelets 260 150 - 450 x10*3/uL    Neutrophils % 85.4 40.0 - 80.0 %    Immature Granulocytes %, Automated 2.2 (H) 0.0 - 0.9 %    Lymphocytes % 5.4 13.0 - 44.0 %    Monocytes % 5.2 2.0 - 10.0 %    Eosinophils % 1.3 0.0 - 6.0 %    Basophils % 0.5 0.0 - 2.0 %    Neutrophils Absolute 14.48 (H) 1.60 - 5.50 x10*3/uL    Immature Granulocytes Absolute, Automated 0.38 0.00 - " 0.50 x10*3/uL    Lymphocytes Absolute 0.92 0.80 - 3.00 x10*3/uL    Monocytes Absolute 0.89 (H) 0.05 - 0.80 x10*3/uL    Eosinophils Absolute 0.22 0.00 - 0.40 x10*3/uL    Basophils Absolute 0.08 0.00 - 0.10 x10*3/uL   Comprehensive metabolic panel   Result Value Ref Range    Glucose 223 (H) 74 - 99 mg/dL    Sodium 136 136 - 145 mmol/L    Potassium 4.0 3.5 - 5.3 mmol/L    Chloride 101 98 - 107 mmol/L    Bicarbonate 25 21 - 32 mmol/L    Anion Gap 14 10 - 20 mmol/L    Urea Nitrogen 20 6 - 23 mg/dL    Creatinine 1.03 0.50 - 1.05 mg/dL    eGFR 54 (L) >60 mL/min/1.73m*2    Calcium 8.9 8.6 - 10.3 mg/dL    Albumin 3.9 3.4 - 5.0 g/dL    Alkaline Phosphatase 73 33 - 136 U/L    Total Protein 6.6 6.4 - 8.2 g/dL    AST 11 9 - 39 U/L    Bilirubin, Total 0.4 0.0 - 1.2 mg/dL    ALT 11 7 - 45 U/L   Lipase   Result Value Ref Range    Lipase 53 9 - 82 U/L   Urinalysis with Reflex Microscopic   Result Value Ref Range    Color, Urine Straw Straw, Yellow    Appearance, Urine Clear Clear    Specific Gravity, Urine 1.040 (N) 1.005 - 1.035    pH, Urine 7.0 5.0, 5.5, 6.0, 6.5, 7.0, 7.5, 8.0    Protein, Urine >=500 (3+) (N) NEGATIVE mg/dL    Glucose, Urine 50 (1+) (A) NEGATIVE mg/dL    Blood, Urine SMALL (1+) (A) NEGATIVE    Ketones, Urine 5 (TRACE) (A) NEGATIVE mg/dL    Bilirubin, Urine NEGATIVE NEGATIVE    Urobilinogen, Urine <2.0 <2.0 mg/dL    Nitrite, Urine NEGATIVE NEGATIVE    Leukocyte Esterase, Urine NEGATIVE NEGATIVE   Microscopic Only, Urine   Result Value Ref Range    WBC, Urine 1-5 1-5, NONE /HPF    RBC, Urine 3-5 NONE, 1-2, 3-5 /HPF     XR chest 1 view    Result Date: 2/17/2024  STUDY: Chest Radiograph;  2/17/2024 2:51 PM INDICATION: Generalized weakness with cough. COMPARISON: 3/16/2019 CTA chest; 3/16/2019 XR chest. ACCESSION NUMBER(S): [id number] ORDERING CLINICIAN: ANASTASIIA CODY TECHNIQUE:  Frontal chest was obtained at 14:51 hours. FINDINGS: CARDIOMEDIASTINAL SILHOUETTE: Cardiomediastinal silhouette is normal in size and  configuration.  LUNGS: Lungs are clear. Loop recorder overlies the left heart border.  ABDOMEN: No remarkable upper abdominal findings.  BONES: No acute osseous changes.    No acute process. Signed by Elliot Stewart MD    CT abdomen pelvis w IV contrast    Result Date: 2/17/2024  STUDY: CT Abdomen and Pelvis with IV Contrast; 2/17/2024 at 2:42 PM. INDICATION: Left lower quadrant abdominal pain. COMPARISON: None available. ACCESSION NUMBER(S): [id number] ORDERING CLINICIAN: ANASTASIIA CODY TECHNIQUE: CT of the abdomen and pelvis was performed.  Contiguous axial images were obtained at 3 mm slice thickness through the abdomen and pelvis. Coronal and sagittal reconstructions at 3 mm slice thickness were performed.  Omnipaque 350 75 mL was administered intravenously.  FINDINGS: LOWER CHEST: No cardiomegaly.  No pericardial effusion.  There is consolidating infiltrate in the right lung base.  There is nodularity present and follow-up after treatment to exclude an underlying nodule is recommended.  There are interstitial changes in the left lung base most likely representing atelectasis or fibrosis.  ABDOMEN:  LIVER: No hepatomegaly.  Smooth surface contour.  Normal attenuation.  BILE DUCTS: No intrahepatic or extrahepatic biliary ductal dilatation.  GALLBLADDER: The gallbladder is absent. STOMACH: No abnormalities identified.  PANCREAS: No masses or ductal dilatation.  SPLEEN: No splenomegaly or focal splenic lesion.  ADRENAL GLANDS: No thickening or nodules.  KIDNEYS AND URETERS: Kidneys are normal in size and location.  There is a 3 mm nonobstructing calculus in the upper pole of the left kidney.  There is thickening of the wall of the renal pelvis on the left.  This can be seen with infection and correlation with the patient's urinalysis is recommended.  PELVIS:  BLADDER: No abnormalities identified.  REPRODUCTIVE ORGANS: No abnormalities identified.  BOWEL: There is a large amount of stool in the rectum.  There is  diverticulosis.  VESSELS: No abnormalities identified.  Abdominal aorta is normal in caliber.  PERITONEUM/RETROPERITONEUM/LYMPH NODES: No free fluid.  No pneumoperitoneum. No lymphadenopathy.  ABDOMINAL WALL: No abnormalities identified. SOFT TISSUES: No abnormalities identified.  BONES: No acute fracture or aggressive osseous lesion.    Diverticulosis without diverticulitis.  There is a large amount of stool in the rectum. Right lower lobe infiltrate.  This does have a nodular component and follow-up after treatment to exclude a pulmonary nodule is recommended. Nonobstructing left renal calculus.  There is thickening of the wall of the left renal pelvis and correlation with the patient's urinalysis to exclude infection is recommended. Signed by Aravind Nieves MD        Assessment/Plan   Principal Problem:    Pneumonia of right lower lobe due to infectious organism  - Ceftriaxone and Azithromycin  - oxygen as needed  - Robitussin   - tessalon perles   - duonebs prn    PAF  - continue eliquis  - metoprolol    HTN  - continue metoprolol, losartan, norvasc    DM2  - ISS    HLD  - statin    Vit B12 deficiency  - cont vit B12 supplements    Hypothyroidism  - per her PCP's note, she had a normal TSH, therefore stopped taking synthroid, Will check a TSH.     Code status  - DNR         I spent 60 minutes in the professional and overall care of this patient.      Shadia Perla MD

## 2024-02-19 ENCOUNTER — APPOINTMENT (OUTPATIENT)
Dept: RADIOLOGY | Facility: HOSPITAL | Age: 84
DRG: 195 | End: 2024-02-19
Payer: MEDICARE

## 2024-02-19 LAB
ANION GAP SERPL CALC-SCNC: 14 MMOL/L (ref 10–20)
BASOPHILS # BLD AUTO: 0.05 X10*3/UL (ref 0–0.1)
BASOPHILS NFR BLD AUTO: 0.4 %
BUN SERPL-MCNC: 22 MG/DL (ref 6–23)
CALCIUM SERPL-MCNC: 7.9 MG/DL (ref 8.6–10.3)
CHLORIDE SERPL-SCNC: 102 MMOL/L (ref 98–107)
CO2 SERPL-SCNC: 22 MMOL/L (ref 21–32)
CREAT SERPL-MCNC: 1.01 MG/DL (ref 0.5–1.05)
EGFRCR SERPLBLD CKD-EPI 2021: 55 ML/MIN/1.73M*2
EOSINOPHIL # BLD AUTO: 0.24 X10*3/UL (ref 0–0.4)
EOSINOPHIL NFR BLD AUTO: 2 %
ERYTHROCYTE [DISTWIDTH] IN BLOOD BY AUTOMATED COUNT: 14.3 % (ref 11.5–14.5)
GLUCOSE BLD MANUAL STRIP-MCNC: 184 MG/DL (ref 74–99)
GLUCOSE BLD MANUAL STRIP-MCNC: 185 MG/DL (ref 74–99)
GLUCOSE BLD MANUAL STRIP-MCNC: 203 MG/DL (ref 74–99)
GLUCOSE BLD MANUAL STRIP-MCNC: 218 MG/DL (ref 74–99)
GLUCOSE SERPL-MCNC: 180 MG/DL (ref 74–99)
HCT VFR BLD AUTO: 35.3 % (ref 36–46)
HGB BLD-MCNC: 10.9 G/DL (ref 12–16)
IMM GRANULOCYTES # BLD AUTO: 0.08 X10*3/UL (ref 0–0.5)
IMM GRANULOCYTES NFR BLD AUTO: 0.7 % (ref 0–0.9)
LYMPHOCYTES # BLD AUTO: 0.88 X10*3/UL (ref 0.8–3)
LYMPHOCYTES NFR BLD AUTO: 7.3 %
MCH RBC QN AUTO: 27.7 PG (ref 26–34)
MCHC RBC AUTO-ENTMCNC: 30.9 G/DL (ref 32–36)
MCV RBC AUTO: 90 FL (ref 80–100)
MONOCYTES # BLD AUTO: 0.48 X10*3/UL (ref 0.05–0.8)
MONOCYTES NFR BLD AUTO: 4 %
NEUTROPHILS # BLD AUTO: 10.26 X10*3/UL (ref 1.6–5.5)
NEUTROPHILS NFR BLD AUTO: 85.6 %
NRBC BLD-RTO: 0 /100 WBCS (ref 0–0)
PLATELET # BLD AUTO: 239 X10*3/UL (ref 150–450)
POTASSIUM SERPL-SCNC: 4 MMOL/L (ref 3.5–5.3)
RBC # BLD AUTO: 3.93 X10*6/UL (ref 4–5.2)
SODIUM SERPL-SCNC: 134 MMOL/L (ref 136–145)
WBC # BLD AUTO: 12 X10*3/UL (ref 4.4–11.3)

## 2024-02-19 PROCEDURE — 74018 RADEX ABDOMEN 1 VIEW: CPT

## 2024-02-19 PROCEDURE — 2500000001 HC RX 250 WO HCPCS SELF ADMINISTERED DRUGS (ALT 637 FOR MEDICARE OP): Performed by: INTERNAL MEDICINE

## 2024-02-19 PROCEDURE — 85025 COMPLETE CBC W/AUTO DIFF WBC: CPT | Performed by: INTERNAL MEDICINE

## 2024-02-19 PROCEDURE — 94640 AIRWAY INHALATION TREATMENT: CPT | Mod: MUE

## 2024-02-19 PROCEDURE — 80048 BASIC METABOLIC PNL TOTAL CA: CPT | Performed by: INTERNAL MEDICINE

## 2024-02-19 PROCEDURE — 82947 ASSAY GLUCOSE BLOOD QUANT: CPT

## 2024-02-19 PROCEDURE — RXMED WILLOW AMBULATORY MEDICATION CHARGE

## 2024-02-19 PROCEDURE — 2500000001 HC RX 250 WO HCPCS SELF ADMINISTERED DRUGS (ALT 637 FOR MEDICARE OP): Performed by: NURSE PRACTITIONER

## 2024-02-19 PROCEDURE — 2500000002 HC RX 250 W HCPCS SELF ADMINISTERED DRUGS (ALT 637 FOR MEDICARE OP, ALT 636 FOR OP/ED): Performed by: HOSPITALIST

## 2024-02-19 PROCEDURE — 2500000001 HC RX 250 WO HCPCS SELF ADMINISTERED DRUGS (ALT 637 FOR MEDICARE OP): Performed by: HOSPITALIST

## 2024-02-19 PROCEDURE — 1100000001 HC PRIVATE ROOM DAILY

## 2024-02-19 PROCEDURE — 36415 COLL VENOUS BLD VENIPUNCTURE: CPT | Performed by: INTERNAL MEDICINE

## 2024-02-19 PROCEDURE — 99233 SBSQ HOSP IP/OBS HIGH 50: CPT | Performed by: INTERNAL MEDICINE

## 2024-02-19 PROCEDURE — 74018 RADEX ABDOMEN 1 VIEW: CPT | Performed by: RADIOLOGY

## 2024-02-19 PROCEDURE — 2500000004 HC RX 250 GENERAL PHARMACY W/ HCPCS (ALT 636 FOR OP/ED): Performed by: HOSPITALIST

## 2024-02-19 PROCEDURE — 2500000004 HC RX 250 GENERAL PHARMACY W/ HCPCS (ALT 636 FOR OP/ED): Performed by: INTERNAL MEDICINE

## 2024-02-19 PROCEDURE — 2500000002 HC RX 250 W HCPCS SELF ADMINISTERED DRUGS (ALT 637 FOR MEDICARE OP, ALT 636 FOR OP/ED): Performed by: PEDIATRICS

## 2024-02-19 RX ORDER — LORAZEPAM 1 MG/1
1 TABLET ORAL ONCE
Status: COMPLETED | OUTPATIENT
Start: 2024-02-19 | End: 2024-02-19

## 2024-02-19 RX ORDER — LOSARTAN POTASSIUM 100 MG/1
100 TABLET ORAL DAILY
Qty: 30 TABLET | Refills: 0
Start: 2024-02-20 | End: 2024-03-21

## 2024-02-19 RX ORDER — LANOLIN ALCOHOL/MO/W.PET/CERES
1000 CREAM (GRAM) TOPICAL DAILY
Qty: 30 TABLET | Refills: 0
Start: 2024-02-20 | End: 2024-03-21

## 2024-02-19 RX ORDER — AMLODIPINE BESYLATE 10 MG/1
10 TABLET ORAL DAILY
Qty: 30 TABLET | Refills: 0 | Status: SHIPPED | OUTPATIENT
Start: 2024-02-19

## 2024-02-19 RX ORDER — ESCITALOPRAM OXALATE 20 MG/1
20 TABLET ORAL DAILY
Qty: 30 TABLET | Refills: 0
Start: 2024-02-20 | End: 2024-03-21

## 2024-02-19 RX ORDER — AMLODIPINE BESYLATE 10 MG/1
10 TABLET ORAL DAILY
Status: DISCONTINUED | OUTPATIENT
Start: 2024-02-20 | End: 2024-02-21 | Stop reason: HOSPADM

## 2024-02-19 RX ORDER — ATORVASTATIN CALCIUM 40 MG/1
40 TABLET, FILM COATED ORAL NIGHTLY
Qty: 30 TABLET | Refills: 0
Start: 2024-02-19 | End: 2024-03-20

## 2024-02-19 RX ORDER — CEFDINIR 300 MG/1
300 CAPSULE ORAL 2 TIMES DAILY
Qty: 4 CAPSULE | Refills: 0 | Status: SHIPPED | OUTPATIENT
Start: 2024-02-19 | End: 2024-02-21 | Stop reason: HOSPADM

## 2024-02-19 RX ORDER — HYDROXYZINE HYDROCHLORIDE 25 MG/1
25 TABLET, FILM COATED ORAL EVERY 8 HOURS PRN
Status: DISCONTINUED | OUTPATIENT
Start: 2024-02-19 | End: 2024-02-21 | Stop reason: HOSPADM

## 2024-02-19 RX ORDER — AMLODIPINE BESYLATE 5 MG/1
5 TABLET ORAL ONCE
Status: COMPLETED | OUTPATIENT
Start: 2024-02-19 | End: 2024-02-19

## 2024-02-19 RX ADMIN — AMLODIPINE BESYLATE 5 MG: 5 TABLET ORAL at 08:18

## 2024-02-19 RX ADMIN — BUDESONIDE INHALATION 0.5 MG: 0.5 SUSPENSION RESPIRATORY (INHALATION) at 07:30

## 2024-02-19 RX ADMIN — AZITHROMYCIN MONOHYDRATE 500 MG: 500 INJECTION, POWDER, LYOPHILIZED, FOR SOLUTION INTRAVENOUS at 20:43

## 2024-02-19 RX ADMIN — ONDANSETRON 4 MG: 2 INJECTION INTRAMUSCULAR; INTRAVENOUS at 05:26

## 2024-02-19 RX ADMIN — FORMOTEROL FUMARATE DIHYDRATE 20 MCG: 20 SOLUTION RESPIRATORY (INHALATION) at 19:18

## 2024-02-19 RX ADMIN — ESCITALOPRAM OXALATE 20 MG: 20 TABLET, FILM COATED ORAL at 08:18

## 2024-02-19 RX ADMIN — AMLODIPINE BESYLATE 5 MG: 5 TABLET ORAL at 13:16

## 2024-02-19 RX ADMIN — BUDESONIDE INHALATION 0.5 MG: 0.5 SUSPENSION RESPIRATORY (INHALATION) at 19:18

## 2024-02-19 RX ADMIN — METOPROLOL TARTRATE 25 MG: 25 TABLET, FILM COATED ORAL at 20:43

## 2024-02-19 RX ADMIN — CEFTRIAXONE 2 G: 2 INJECTION, POWDER, FOR SOLUTION INTRAMUSCULAR; INTRAVENOUS at 12:39

## 2024-02-19 RX ADMIN — APIXABAN 5 MG: 5 TABLET, FILM COATED ORAL at 12:39

## 2024-02-19 RX ADMIN — FORMOTEROL FUMARATE DIHYDRATE 20 MCG: 20 SOLUTION RESPIRATORY (INHALATION) at 07:30

## 2024-02-19 RX ADMIN — CYANOCOBALAMIN TAB 1000 MCG 1000 MCG: 1000 TAB at 08:19

## 2024-02-19 RX ADMIN — INSULIN LISPRO 2 UNITS: 100 INJECTION, SOLUTION INTRAVENOUS; SUBCUTANEOUS at 12:39

## 2024-02-19 RX ADMIN — ATORVASTATIN CALCIUM 40 MG: 40 TABLET, FILM COATED ORAL at 20:43

## 2024-02-19 RX ADMIN — APIXABAN 5 MG: 5 TABLET, FILM COATED ORAL at 00:05

## 2024-02-19 RX ADMIN — METOPROLOL TARTRATE 25 MG: 25 TABLET, FILM COATED ORAL at 08:18

## 2024-02-19 RX ADMIN — HYDROXYZINE HYDROCHLORIDE 25 MG: 25 TABLET ORAL at 17:35

## 2024-02-19 RX ADMIN — INSULIN LISPRO 4 UNITS: 100 INJECTION, SOLUTION INTRAVENOUS; SUBCUTANEOUS at 17:35

## 2024-02-19 RX ADMIN — LOSARTAN POTASSIUM 100 MG: 50 TABLET, FILM COATED ORAL at 08:18

## 2024-02-19 RX ADMIN — INSULIN LISPRO 2 UNITS: 100 INJECTION, SOLUTION INTRAVENOUS; SUBCUTANEOUS at 08:19

## 2024-02-19 RX ADMIN — LORAZEPAM 1 MG: 1 TABLET ORAL at 00:56

## 2024-02-19 ASSESSMENT — PAIN SCALES - GENERAL
PAINLEVEL_OUTOF10: 0 - NO PAIN
PAINLEVEL_OUTOF10: 0 - NO PAIN

## 2024-02-19 ASSESSMENT — COGNITIVE AND FUNCTIONAL STATUS - GENERAL
WALKING IN HOSPITAL ROOM: A LITTLE
DAILY ACTIVITIY SCORE: 24
MOBILITY SCORE: 21
STANDING UP FROM CHAIR USING ARMS: A LITTLE
CLIMB 3 TO 5 STEPS WITH RAILING: A LITTLE

## 2024-02-19 ASSESSMENT — PAIN - FUNCTIONAL ASSESSMENT: PAIN_FUNCTIONAL_ASSESSMENT: 0-10

## 2024-02-19 NOTE — PROGRESS NOTES
"Caitlyn Juan is a 83 y.o. female on day 2 of admission presenting with Pneumonia of right lower lobe due to infectious organism.    Subjective     BELEM resolved complains of nausea, ck abd x ray       Objective     Physical Exam  Constitutional:       Appearance: Normal appearance.   HENT:      Head: Normocephalic.      Nose: Nose normal.      Mouth/Throat:      Mouth: Mucous membranes are dry.      Pharynx: Oropharynx is clear.   Cardiovascular:      Rate and Rhythm: Normal rate.   Pulmonary:      Effort: Pulmonary effort is normal.      Breath sounds: Normal breath sounds.   Abdominal:      General: Abdomen is flat. Bowel sounds are normal.      Palpations: Abdomen is soft.   Skin:     General: Skin is warm and dry.      Capillary Refill: Capillary refill takes less than 2 seconds.   Neurological:      General: No focal deficit present.      Mental Status: She is alert.         Last Recorded Vitals  Blood pressure 168/78, pulse 97, temperature 36.4 °C (97.6 °F), temperature source Oral, resp. rate 18, height 1.626 m (5' 4\"), weight 69.9 kg (154 lb), SpO2 95 %.  Intake/Output last 3 Shifts:  I/O last 3 completed shifts:  In: 2100 (30.1 mL/kg) [P.O.:720; I.V.:1080 (15.5 mL/kg); IV Piggyback:300]  Out: - (0 mL/kg)   Weight: 69.9 kg     Relevant Results  Results for orders placed or performed during the hospital encounter of 02/17/24 (from the past 24 hour(s))   POCT GLUCOSE   Result Value Ref Range    POCT Glucose 202 (H) 74 - 99 mg/dL   POCT GLUCOSE   Result Value Ref Range    POCT Glucose 114 (H) 74 - 99 mg/dL   CBC and Auto Differential   Result Value Ref Range    WBC 12.0 (H) 4.4 - 11.3 x10*3/uL    nRBC 0.0 0.0 - 0.0 /100 WBCs    RBC 3.93 (L) 4.00 - 5.20 x10*6/uL    Hemoglobin 10.9 (L) 12.0 - 16.0 g/dL    Hematocrit 35.3 (L) 36.0 - 46.0 %    MCV 90 80 - 100 fL    MCH 27.7 26.0 - 34.0 pg    MCHC 30.9 (L) 32.0 - 36.0 g/dL    RDW 14.3 11.5 - 14.5 %    Platelets 239 150 - 450 x10*3/uL    Neutrophils % 85.6 40.0 - 80.0 " %    Immature Granulocytes %, Automated 0.7 0.0 - 0.9 %    Lymphocytes % 7.3 13.0 - 44.0 %    Monocytes % 4.0 2.0 - 10.0 %    Eosinophils % 2.0 0.0 - 6.0 %    Basophils % 0.4 0.0 - 2.0 %    Neutrophils Absolute 10.26 (H) 1.60 - 5.50 x10*3/uL    Immature Granulocytes Absolute, Automated 0.08 0.00 - 0.50 x10*3/uL    Lymphocytes Absolute 0.88 0.80 - 3.00 x10*3/uL    Monocytes Absolute 0.48 0.05 - 0.80 x10*3/uL    Eosinophils Absolute 0.24 0.00 - 0.40 x10*3/uL    Basophils Absolute 0.05 0.00 - 0.10 x10*3/uL   Basic metabolic panel   Result Value Ref Range    Glucose 180 (H) 74 - 99 mg/dL    Sodium 134 (L) 136 - 145 mmol/L    Potassium 4.0 3.5 - 5.3 mmol/L    Chloride 102 98 - 107 mmol/L    Bicarbonate 22 21 - 32 mmol/L    Anion Gap 14 10 - 20 mmol/L    Urea Nitrogen 22 6 - 23 mg/dL    Creatinine 1.01 0.50 - 1.05 mg/dL    eGFR 55 (L) >60 mL/min/1.73m*2    Calcium 7.9 (L) 8.6 - 10.3 mg/dL   POCT GLUCOSE   Result Value Ref Range    POCT Glucose 185 (H) 74 - 99 mg/dL       Imaging Results  Cxr:  IMPRESSION:  No acute process    Ct abd/pelvis:    IMPRESSION:  Diverticulosis without diverticulitis.  There is a large amount of  stool in the rectum.  Right lower lobe infiltrate.  This does have a nodular component and  follow-up after treatment to exclude a pulmonary nodule is  recommended.  Nonobstructing left renal calculus.  There is thickening of the wall  of the left renal pelvis and correlation with the patient's urinalysis  to exclude infection is recommended.    Medications:  amLODIPine, 5 mg, oral, Daily  apixaban, 5 mg, oral, q12h  atorvastatin, 40 mg, oral, Nightly  azithromycin, 500 mg, intravenous, q24h  budesonide, 0.5 mg, nebulization, BID  cefTRIAXone, 2 g, intravenous, Daily  cyanocobalamin, 1,000 mcg, oral, Daily  escitalopram, 20 mg, oral, Daily  formoterol, 20 mcg, nebulization, BID  HYDROmorphone, 0.2 mg, intravenous, Once  insulin lispro, 0-10 Units, subcutaneous, TID with meals  losartan, 100 mg, oral,  Daily  melatonin, 3 mg, oral, Daily  metoprolol tartrate, 25 mg, oral, BID  polyethylene glycol, 17 g, oral, Daily       PRN medications: acetaminophen, acetaminophen, benzonatate, dextrose 10 % in water (D10W), dextrose, glucagon, guaiFENesin, guaiFENesin, ipratropium-albuteroL, ondansetron ODT **OR** ondansetron, oxygen     Assessment/Plan   RLL PNA  - continue azithromycin and iv ceftriaxone day 3    2.PAF  -on eliquis    3. BELEM  -resolved    4. DMII  -SSI    5. Nausea  -r/o ileus  -pending abd  xray    Dispo: dc in am  DVT Prophylaxis:  Parris Atkinson MD  [facility]

## 2024-02-20 ENCOUNTER — APPOINTMENT (OUTPATIENT)
Dept: CARDIOLOGY | Facility: HOSPITAL | Age: 84
DRG: 195 | End: 2024-02-20
Payer: MEDICARE

## 2024-02-20 ENCOUNTER — PHARMACY VISIT (OUTPATIENT)
Dept: PHARMACY | Facility: CLINIC | Age: 84
End: 2024-02-20
Payer: COMMERCIAL

## 2024-02-20 LAB
ANION GAP SERPL CALC-SCNC: 13 MMOL/L (ref 10–20)
BASOPHILS # BLD AUTO: 0.04 X10*3/UL (ref 0–0.1)
BASOPHILS NFR BLD AUTO: 0.4 %
BUN SERPL-MCNC: 21 MG/DL (ref 6–23)
CALCIUM SERPL-MCNC: 7.8 MG/DL (ref 8.6–10.3)
CHLORIDE SERPL-SCNC: 101 MMOL/L (ref 98–107)
CO2 SERPL-SCNC: 24 MMOL/L (ref 21–32)
CREAT SERPL-MCNC: 1.1 MG/DL (ref 0.5–1.05)
EGFRCR SERPLBLD CKD-EPI 2021: 50 ML/MIN/1.73M*2
EOSINOPHIL # BLD AUTO: 0.02 X10*3/UL (ref 0–0.4)
EOSINOPHIL NFR BLD AUTO: 0.2 %
ERYTHROCYTE [DISTWIDTH] IN BLOOD BY AUTOMATED COUNT: 14.1 % (ref 11.5–14.5)
GLUCOSE BLD MANUAL STRIP-MCNC: 165 MG/DL (ref 74–99)
GLUCOSE BLD MANUAL STRIP-MCNC: 181 MG/DL (ref 74–99)
GLUCOSE BLD MANUAL STRIP-MCNC: 202 MG/DL (ref 74–99)
GLUCOSE BLD MANUAL STRIP-MCNC: 207 MG/DL (ref 74–99)
GLUCOSE SERPL-MCNC: 168 MG/DL (ref 74–99)
HCT VFR BLD AUTO: 32.4 % (ref 36–46)
HGB BLD-MCNC: 10.3 G/DL (ref 12–16)
IMM GRANULOCYTES # BLD AUTO: 0.08 X10*3/UL (ref 0–0.5)
IMM GRANULOCYTES NFR BLD AUTO: 0.8 % (ref 0–0.9)
LYMPHOCYTES # BLD AUTO: 0.82 X10*3/UL (ref 0.8–3)
LYMPHOCYTES NFR BLD AUTO: 8.1 %
MCH RBC QN AUTO: 28 PG (ref 26–34)
MCHC RBC AUTO-ENTMCNC: 31.8 G/DL (ref 32–36)
MCV RBC AUTO: 88 FL (ref 80–100)
MONOCYTES # BLD AUTO: 0.7 X10*3/UL (ref 0.05–0.8)
MONOCYTES NFR BLD AUTO: 6.9 %
NEUTROPHILS # BLD AUTO: 8.47 X10*3/UL (ref 1.6–5.5)
NEUTROPHILS NFR BLD AUTO: 83.6 %
NRBC BLD-RTO: 0 /100 WBCS (ref 0–0)
PLATELET # BLD AUTO: 241 X10*3/UL (ref 150–450)
POTASSIUM SERPL-SCNC: 3.6 MMOL/L (ref 3.5–5.3)
RBC # BLD AUTO: 3.68 X10*6/UL (ref 4–5.2)
SODIUM SERPL-SCNC: 134 MMOL/L (ref 136–145)
WBC # BLD AUTO: 10.1 X10*3/UL (ref 4.4–11.3)

## 2024-02-20 PROCEDURE — 2500000001 HC RX 250 WO HCPCS SELF ADMINISTERED DRUGS (ALT 637 FOR MEDICARE OP): Performed by: NURSE PRACTITIONER

## 2024-02-20 PROCEDURE — 93005 ELECTROCARDIOGRAM TRACING: CPT

## 2024-02-20 PROCEDURE — 99232 SBSQ HOSP IP/OBS MODERATE 35: CPT | Performed by: INTERNAL MEDICINE

## 2024-02-20 PROCEDURE — 94640 AIRWAY INHALATION TREATMENT: CPT | Mod: MUE

## 2024-02-20 PROCEDURE — 1100000001 HC PRIVATE ROOM DAILY

## 2024-02-20 PROCEDURE — 80048 BASIC METABOLIC PNL TOTAL CA: CPT | Performed by: INTERNAL MEDICINE

## 2024-02-20 PROCEDURE — 2500000002 HC RX 250 W HCPCS SELF ADMINISTERED DRUGS (ALT 637 FOR MEDICARE OP, ALT 636 FOR OP/ED): Performed by: HOSPITALIST

## 2024-02-20 PROCEDURE — 2500000002 HC RX 250 W HCPCS SELF ADMINISTERED DRUGS (ALT 637 FOR MEDICARE OP, ALT 636 FOR OP/ED): Performed by: PEDIATRICS

## 2024-02-20 PROCEDURE — 82947 ASSAY GLUCOSE BLOOD QUANT: CPT

## 2024-02-20 PROCEDURE — 2500000001 HC RX 250 WO HCPCS SELF ADMINISTERED DRUGS (ALT 637 FOR MEDICARE OP): Performed by: HOSPITALIST

## 2024-02-20 PROCEDURE — 36415 COLL VENOUS BLD VENIPUNCTURE: CPT | Performed by: INTERNAL MEDICINE

## 2024-02-20 PROCEDURE — 85025 COMPLETE CBC W/AUTO DIFF WBC: CPT | Performed by: INTERNAL MEDICINE

## 2024-02-20 PROCEDURE — 2500000004 HC RX 250 GENERAL PHARMACY W/ HCPCS (ALT 636 FOR OP/ED): Performed by: INTERNAL MEDICINE

## 2024-02-20 PROCEDURE — 2500000001 HC RX 250 WO HCPCS SELF ADMINISTERED DRUGS (ALT 637 FOR MEDICARE OP): Performed by: INTERNAL MEDICINE

## 2024-02-20 PROCEDURE — 2500000004 HC RX 250 GENERAL PHARMACY W/ HCPCS (ALT 636 FOR OP/ED): Performed by: HOSPITALIST

## 2024-02-20 RX ADMIN — METOPROLOL TARTRATE 25 MG: 25 TABLET, FILM COATED ORAL at 08:15

## 2024-02-20 RX ADMIN — METOPROLOL TARTRATE 25 MG: 25 TABLET, FILM COATED ORAL at 21:07

## 2024-02-20 RX ADMIN — CEFTRIAXONE 2 G: 2 INJECTION, POWDER, FOR SOLUTION INTRAMUSCULAR; INTRAVENOUS at 08:15

## 2024-02-20 RX ADMIN — HYDROXYZINE HYDROCHLORIDE 25 MG: 25 TABLET ORAL at 15:52

## 2024-02-20 RX ADMIN — POLYETHYLENE GLYCOL 3350 17 G: 17 POWDER, FOR SOLUTION ORAL at 08:16

## 2024-02-20 RX ADMIN — APIXABAN 5 MG: 5 TABLET, FILM COATED ORAL at 11:53

## 2024-02-20 RX ADMIN — HYDROXYZINE HYDROCHLORIDE 25 MG: 25 TABLET ORAL at 01:48

## 2024-02-20 RX ADMIN — BUDESONIDE INHALATION 0.5 MG: 0.5 SUSPENSION RESPIRATORY (INHALATION) at 07:23

## 2024-02-20 RX ADMIN — AMLODIPINE BESYLATE 10 MG: 10 TABLET ORAL at 08:15

## 2024-02-20 RX ADMIN — ATORVASTATIN CALCIUM 40 MG: 40 TABLET, FILM COATED ORAL at 21:07

## 2024-02-20 RX ADMIN — LOSARTAN POTASSIUM 100 MG: 50 TABLET, FILM COATED ORAL at 08:15

## 2024-02-20 RX ADMIN — BUDESONIDE INHALATION 0.5 MG: 0.5 SUSPENSION RESPIRATORY (INHALATION) at 19:26

## 2024-02-20 RX ADMIN — APIXABAN 5 MG: 5 TABLET, FILM COATED ORAL at 01:48

## 2024-02-20 RX ADMIN — INSULIN LISPRO 4 UNITS: 100 INJECTION, SOLUTION INTRAVENOUS; SUBCUTANEOUS at 08:43

## 2024-02-20 RX ADMIN — CYANOCOBALAMIN TAB 1000 MCG 1000 MCG: 1000 TAB at 08:15

## 2024-02-20 RX ADMIN — FORMOTEROL FUMARATE DIHYDRATE 20 MCG: 20 SOLUTION RESPIRATORY (INHALATION) at 07:23

## 2024-02-20 RX ADMIN — INSULIN LISPRO 2 UNITS: 100 INJECTION, SOLUTION INTRAVENOUS; SUBCUTANEOUS at 17:56

## 2024-02-20 RX ADMIN — FORMOTEROL FUMARATE DIHYDRATE 20 MCG: 20 SOLUTION RESPIRATORY (INHALATION) at 19:26

## 2024-02-20 RX ADMIN — INSULIN LISPRO 2 UNITS: 100 INJECTION, SOLUTION INTRAVENOUS; SUBCUTANEOUS at 11:53

## 2024-02-20 RX ADMIN — ESCITALOPRAM OXALATE 20 MG: 20 TABLET, FILM COATED ORAL at 08:15

## 2024-02-20 ASSESSMENT — COGNITIVE AND FUNCTIONAL STATUS - GENERAL
CLIMB 3 TO 5 STEPS WITH RAILING: A LOT
MOBILITY SCORE: 22
DAILY ACTIVITIY SCORE: 23
DRESSING REGULAR LOWER BODY CLOTHING: A LITTLE
CLIMB 3 TO 5 STEPS WITH RAILING: A LITTLE
CLIMB 3 TO 5 STEPS WITH RAILING: A LITTLE
DRESSING REGULAR LOWER BODY CLOTHING: A LITTLE
MOBILITY SCORE: 22
WALKING IN HOSPITAL ROOM: A LITTLE
MOBILITY SCORE: 21
DAILY ACTIVITIY SCORE: 23
DAILY ACTIVITIY SCORE: 24
WALKING IN HOSPITAL ROOM: A LITTLE
WALKING IN HOSPITAL ROOM: A LITTLE

## 2024-02-20 ASSESSMENT — ACTIVITIES OF DAILY LIVING (ADL): LACK_OF_TRANSPORTATION: NO

## 2024-02-20 ASSESSMENT — PAIN SCALES - GENERAL: PAINLEVEL_OUTOF10: 0 - NO PAIN

## 2024-02-20 NOTE — CARE PLAN
Sw informed by  that pt  is  concerned about  going  home due to lack of  support  at home  as  pt lives alone, had  weakness. Pt is A+Ox4. Pt has current plans to  go home with [facility].      SW  spoke to [facility]  health worker  about  pt  who will discuss options  with pt  including  contacting  [facility]  to see if they  can be  of  assistance.  Pt also needs IMM.       Lulú Orellana, MSW, LSW

## 2024-02-20 NOTE — PROGRESS NOTES
Caitlyn Juan is a 83 y.o. female on day 3 of admission presenting with Pneumonia of right lower lobe due to infectious organism.     02/20/24 1016   Discharge Planning   Living Arrangements Alone   Support Systems Friends/neighbors   Assistance Needed independent with care at baseline   Type of Residence Private residence   Number of Stairs to Enter Residence 3   Number of Stairs Within Residence 12   Do you have animals or pets at home? Yes   Type of Animals or Pets cat   Who is requesting discharge planning? Patient   Home or Post Acute Services In home services   Type of Home Care Services Home PT;Home OT   Patient expects to be discharged to: Home   Does the patient need discharge transport arranged? Yes   RoundTrip coordination needed? Yes   Has discharge transport been arranged? No   Financial Resource Strain   How hard is it for you to pay for the very basics like food, housing, medical care, and heating? Not hard   Housing Stability   In the last 12 months, was there a time when you were not able to pay the mortgage or rent on time? N   In the last 12 months, was there a time when you did not have a steady place to sleep or slept in a shelter (including now)? N   Transportation Needs   In the past 12 months, has lack of transportation kept you from medical appointments or from getting medications? no   In the past 12 months, has lack of transportation kept you from meetings, work, or from getting things needed for daily living? No   Patient Choice   Provider Choice list and CMS website (https://medicare.gov/care-compare#search) for post-acute Quality and Resource Measure Data were provided and reviewed with: Patient   Patient / Family choosing to utilize agency / facility established prior to hospitalization Yes     Spoke with patient to discuss her preferences for care. Discussed how patient manages health at home. Lives home alone. Independent in all ADLS at her baseline.  No home O2, dialysis, or  assistive devices. Patient states she does her own grocery shopping, drives. Patient denies any problems getting to appointments or obtaining/affording medications.  Discussed home health care options. Patient/family provided disclosure statement and agency listing. [facility] selected. Informed MD/NP and home care liaison. Final home care orders need to be sent upon DC from hospital. No additional resources or needs identified.    Plan: admitted for PNA, receiving IV abx and on 3L O2, does not wear O2 at home. Patient states she feels weak, but able to ambulate to and from bathroom.   Status: inpatient  Payor: medicare  Disposition: home, [facility]  Barrier: shortness of breath, weakness  ADOD:   1-2 days      Faiza Salazar RN

## 2024-02-20 NOTE — PROGRESS NOTES
"Spiritual Care Visit    Clinical Encounter Type  Visited With: Patient  Routine Visit: Introduction  Continue Visiting: Yes  Referral To:     Religion Encounters  Religion Needs: Prayer    Assessment: Pt stated that she did not feel comfortable going home because she was so weak. She share that she was anxious, but not when she is at home.   Concern: There are no family friends that the pt can ask for help.  Awareness: \"I can take care of myself, but not right now.\"  Support: The pt was told that a  would be called and it was done.  Outcome: After prayer and other encouragements the pt shared that she was tired and may she should be prepared to die. After further discussion the pt stated that she needed to depend more on her latasha. \"He (God) has taken care of me all this time.\"   Follow up: There will be another visit scheduled.    Chaplain Joey Shine"

## 2024-02-20 NOTE — ED NOTES
CHW was informed by  to assist with patient signing off IMM's form. CHW explained the IMM form before signing, patient signed off, and CHW put a copy in her chart. CHW mentioned a homemaking services interest from [facility], but patient refused. Patient mention that she would be interested in having a overnight HHC. Patient does have a HHC from [facility] to assist her.  Patient expressed appreciation.     Marcelina Jacobson [facility]  02/20/24 1615       Marcelina Jacobson [facility]  02/20/24 1614

## 2024-02-20 NOTE — PROGRESS NOTES
"Caitlyn Juan is a 83 y.o. female on day 3 of admission presenting with Pneumonia of right lower lobe due to infectious organism.    Subjective     No acute events overnight. Feeling better but still not well. Discussed it could be several days before she is feeling back to normal.        Objective     Physical Exam  Constitutional:       Appearance: Normal appearance.   HENT:      Head: Normocephalic.      Nose: Nose normal.      Mouth/Throat:      Mouth: Mucous membranes are dry.      Pharynx: Oropharynx is clear.   Cardiovascular:      Rate and Rhythm: Normal rate.   Pulmonary:      Effort: Pulmonary effort is normal.      Breath sounds: Normal breath sounds.   Abdominal:      General: Abdomen is flat. Bowel sounds are normal.      Palpations: Abdomen is soft.   Skin:     General: Skin is warm and dry.      Capillary Refill: Capillary refill takes less than 2 seconds.   Neurological:      General: No focal deficit present.      Mental Status: She is alert.         Last Recorded Vitals  Blood pressure 157/65, pulse 80, temperature 36.6 °C (97.9 °F), temperature source Oral, resp. rate 18, height 1.626 m (5' 4\"), weight 69.9 kg (154 lb), SpO2 93 %.  Intake/Output last 3 Shifts:  I/O last 3 completed shifts:  In: 1200 (17.2 mL/kg) [I.V.:900 (12.9 mL/kg); IV Piggyback:300]  Out: - (0 mL/kg)   Weight: 69.9 kg     Relevant Results  Results for orders placed or performed during the hospital encounter of 02/17/24 (from the past 24 hour(s))   POCT GLUCOSE   Result Value Ref Range    POCT Glucose 184 (H) 74 - 99 mg/dL   POCT GLUCOSE   Result Value Ref Range    POCT Glucose 203 (H) 74 - 99 mg/dL   POCT GLUCOSE   Result Value Ref Range    POCT Glucose 218 (H) 74 - 99 mg/dL   CBC and Auto Differential   Result Value Ref Range    WBC 10.1 4.4 - 11.3 x10*3/uL    nRBC 0.0 0.0 - 0.0 /100 WBCs    RBC 3.68 (L) 4.00 - 5.20 x10*6/uL    Hemoglobin 10.3 (L) 12.0 - 16.0 g/dL    Hematocrit 32.4 (L) 36.0 - 46.0 %    MCV 88 80 - 100 fL    " MCH 28.0 26.0 - 34.0 pg    MCHC 31.8 (L) 32.0 - 36.0 g/dL    RDW 14.1 11.5 - 14.5 %    Platelets 241 150 - 450 x10*3/uL    Neutrophils % 83.6 40.0 - 80.0 %    Immature Granulocytes %, Automated 0.8 0.0 - 0.9 %    Lymphocytes % 8.1 13.0 - 44.0 %    Monocytes % 6.9 2.0 - 10.0 %    Eosinophils % 0.2 0.0 - 6.0 %    Basophils % 0.4 0.0 - 2.0 %    Neutrophils Absolute 8.47 (H) 1.60 - 5.50 x10*3/uL    Immature Granulocytes Absolute, Automated 0.08 0.00 - 0.50 x10*3/uL    Lymphocytes Absolute 0.82 0.80 - 3.00 x10*3/uL    Monocytes Absolute 0.70 0.05 - 0.80 x10*3/uL    Eosinophils Absolute 0.02 0.00 - 0.40 x10*3/uL    Basophils Absolute 0.04 0.00 - 0.10 x10*3/uL   Basic metabolic panel   Result Value Ref Range    Glucose 168 (H) 74 - 99 mg/dL    Sodium 134 (L) 136 - 145 mmol/L    Potassium 3.6 3.5 - 5.3 mmol/L    Chloride 101 98 - 107 mmol/L    Bicarbonate 24 21 - 32 mmol/L    Anion Gap 13 10 - 20 mmol/L    Urea Nitrogen 21 6 - 23 mg/dL    Creatinine 1.10 (H) 0.50 - 1.05 mg/dL    eGFR 50 (L) >60 mL/min/1.73m*2    Calcium 7.8 (L) 8.6 - 10.3 mg/dL   POCT GLUCOSE   Result Value Ref Range    POCT Glucose 202 (H) 74 - 99 mg/dL       Imaging Results  Cxr:  IMPRESSION:  No acute process    Ct abd/pelvis:    IMPRESSION:  Diverticulosis without diverticulitis.  There is a large amount of  stool in the rectum.  Right lower lobe infiltrate.  This does have a nodular component and  follow-up after treatment to exclude a pulmonary nodule is  recommended.  Nonobstructing left renal calculus.  There is thickening of the wall  of the left renal pelvis and correlation with the patient's urinalysis  to exclude infection is recommended.    Medications:  amLODIPine, 10 mg, oral, Daily  apixaban, 5 mg, oral, q12h  atorvastatin, 40 mg, oral, Nightly  budesonide, 0.5 mg, nebulization, BID  cefTRIAXone, 2 g, intravenous, Daily  cyanocobalamin, 1,000 mcg, oral, Daily  escitalopram, 20 mg, oral, Daily  formoterol, 20 mcg, nebulization,  BID  HYDROmorphone, 0.2 mg, intravenous, Once  insulin lispro, 0-10 Units, subcutaneous, TID with meals  losartan, 100 mg, oral, Daily  melatonin, 3 mg, oral, Daily  metoprolol tartrate, 25 mg, oral, BID  polyethylene glycol, 17 g, oral, Daily       PRN medications: acetaminophen, acetaminophen, benzonatate, dextrose 10 % in water (D10W), dextrose, glucagon, guaiFENesin, guaiFENesin, hydrOXYzine HCL, ipratropium-albuteroL, ondansetron ODT **OR** ondansetron, oxygen     Assessment/Plan   RLL PNA  - continue azithromycin and iv ceftriaxone day 4    2.PAF  -on eliquis    3. BELEM  -resolved    4. DMII  -SSI    5. Nausea  -r/o ileus  -pending abd  xray    Dispo: mayelin in am  DVT Prophylaxis:  Parris Ramirez, [facility]

## 2024-02-20 NOTE — CARE PLAN
The patient's goals for the shift include  safety    The clinical goals for the shift include pt remains hemodynamically stable

## 2024-02-21 VITALS
RESPIRATION RATE: 18 BRPM | HEART RATE: 75 BPM | WEIGHT: 154 LBS | OXYGEN SATURATION: 92 % | DIASTOLIC BLOOD PRESSURE: 73 MMHG | BODY MASS INDEX: 26.29 KG/M2 | HEIGHT: 64 IN | SYSTOLIC BLOOD PRESSURE: 167 MMHG | TEMPERATURE: 98.1 F

## 2024-02-21 LAB
ANION GAP SERPL CALC-SCNC: 16 MMOL/L (ref 10–20)
BASOPHILS # BLD AUTO: 0.04 X10*3/UL (ref 0–0.1)
BASOPHILS NFR BLD AUTO: 0.5 %
BUN SERPL-MCNC: 19 MG/DL (ref 6–23)
CALCIUM SERPL-MCNC: 8.1 MG/DL (ref 8.6–10.3)
CHLORIDE SERPL-SCNC: 101 MMOL/L (ref 98–107)
CO2 SERPL-SCNC: 22 MMOL/L (ref 21–32)
CREAT SERPL-MCNC: 0.92 MG/DL (ref 0.5–1.05)
EGFRCR SERPLBLD CKD-EPI 2021: 62 ML/MIN/1.73M*2
EOSINOPHIL # BLD AUTO: 0.2 X10*3/UL (ref 0–0.4)
EOSINOPHIL NFR BLD AUTO: 2.4 %
ERYTHROCYTE [DISTWIDTH] IN BLOOD BY AUTOMATED COUNT: 13.7 % (ref 11.5–14.5)
GLUCOSE BLD MANUAL STRIP-MCNC: 172 MG/DL (ref 74–99)
GLUCOSE BLD MANUAL STRIP-MCNC: 187 MG/DL (ref 74–99)
GLUCOSE BLD MANUAL STRIP-MCNC: 191 MG/DL (ref 74–99)
GLUCOSE SERPL-MCNC: 175 MG/DL (ref 74–99)
HCT VFR BLD AUTO: 31.4 % (ref 36–46)
HGB BLD-MCNC: 10.8 G/DL (ref 12–16)
IMM GRANULOCYTES # BLD AUTO: 0.05 X10*3/UL (ref 0–0.5)
IMM GRANULOCYTES NFR BLD AUTO: 0.6 % (ref 0–0.9)
LYMPHOCYTES # BLD AUTO: 0.65 X10*3/UL (ref 0.8–3)
LYMPHOCYTES NFR BLD AUTO: 7.8 %
MCH RBC QN AUTO: 28.1 PG (ref 26–34)
MCHC RBC AUTO-ENTMCNC: 34.4 G/DL (ref 32–36)
MCV RBC AUTO: 82 FL (ref 80–100)
MONOCYTES # BLD AUTO: 0.72 X10*3/UL (ref 0.05–0.8)
MONOCYTES NFR BLD AUTO: 8.7 %
NEUTROPHILS # BLD AUTO: 6.66 X10*3/UL (ref 1.6–5.5)
NEUTROPHILS NFR BLD AUTO: 80 %
NRBC BLD-RTO: 0 /100 WBCS (ref 0–0)
PLATELET # BLD AUTO: 244 X10*3/UL (ref 150–450)
POTASSIUM SERPL-SCNC: 3.5 MMOL/L (ref 3.5–5.3)
RBC # BLD AUTO: 3.84 X10*6/UL (ref 4–5.2)
SODIUM SERPL-SCNC: 135 MMOL/L (ref 136–145)
WBC # BLD AUTO: 8.3 X10*3/UL (ref 4.4–11.3)

## 2024-02-21 PROCEDURE — 82947 ASSAY GLUCOSE BLOOD QUANT: CPT

## 2024-02-21 PROCEDURE — 2500000001 HC RX 250 WO HCPCS SELF ADMINISTERED DRUGS (ALT 637 FOR MEDICARE OP): Performed by: HOSPITALIST

## 2024-02-21 PROCEDURE — 36415 COLL VENOUS BLD VENIPUNCTURE: CPT | Performed by: INTERNAL MEDICINE

## 2024-02-21 PROCEDURE — 2500000004 HC RX 250 GENERAL PHARMACY W/ HCPCS (ALT 636 FOR OP/ED): Performed by: INTERNAL MEDICINE

## 2024-02-21 PROCEDURE — 99239 HOSP IP/OBS DSCHRG MGMT >30: CPT | Performed by: INTERNAL MEDICINE

## 2024-02-21 PROCEDURE — 2500000002 HC RX 250 W HCPCS SELF ADMINISTERED DRUGS (ALT 637 FOR MEDICARE OP, ALT 636 FOR OP/ED): Performed by: HOSPITALIST

## 2024-02-21 PROCEDURE — 97165 OT EVAL LOW COMPLEX 30 MIN: CPT | Mod: GO

## 2024-02-21 PROCEDURE — 97530 THERAPEUTIC ACTIVITIES: CPT | Mod: GO

## 2024-02-21 PROCEDURE — 2500000002 HC RX 250 W HCPCS SELF ADMINISTERED DRUGS (ALT 637 FOR MEDICARE OP, ALT 636 FOR OP/ED): Performed by: PEDIATRICS

## 2024-02-21 PROCEDURE — 2500000001 HC RX 250 WO HCPCS SELF ADMINISTERED DRUGS (ALT 637 FOR MEDICARE OP): Performed by: NURSE PRACTITIONER

## 2024-02-21 PROCEDURE — 94640 AIRWAY INHALATION TREATMENT: CPT

## 2024-02-21 PROCEDURE — 80048 BASIC METABOLIC PNL TOTAL CA: CPT | Performed by: INTERNAL MEDICINE

## 2024-02-21 PROCEDURE — 2500000004 HC RX 250 GENERAL PHARMACY W/ HCPCS (ALT 636 FOR OP/ED): Performed by: HOSPITALIST

## 2024-02-21 PROCEDURE — 94761 N-INVAS EAR/PLS OXIMETRY MLT: CPT | Mod: MUE

## 2024-02-21 PROCEDURE — 85025 COMPLETE CBC W/AUTO DIFF WBC: CPT | Performed by: INTERNAL MEDICINE

## 2024-02-21 PROCEDURE — 2500000001 HC RX 250 WO HCPCS SELF ADMINISTERED DRUGS (ALT 637 FOR MEDICARE OP): Performed by: INTERNAL MEDICINE

## 2024-02-21 RX ORDER — LOSARTAN POTASSIUM 100 MG/1
100 TABLET ORAL DAILY
COMMUNITY
End: 2024-02-21 | Stop reason: HOSPADM

## 2024-02-21 RX ORDER — ESCITALOPRAM OXALATE 20 MG/1
20 TABLET ORAL DAILY
COMMUNITY
End: 2024-02-21 | Stop reason: HOSPADM

## 2024-02-21 RX ORDER — AMLODIPINE BESYLATE 10 MG/1
10 TABLET ORAL DAILY
COMMUNITY
End: 2024-02-21 | Stop reason: HOSPADM

## 2024-02-21 RX ORDER — LORAZEPAM 1 MG/1
1 TABLET ORAL NIGHTLY PRN
COMMUNITY

## 2024-02-21 RX ORDER — BUDESONIDE AND FORMOTEROL FUMARATE DIHYDRATE 160; 4.5 UG/1; UG/1
2 AEROSOL RESPIRATORY (INHALATION)
COMMUNITY

## 2024-02-21 RX ORDER — FLUTICASONE PROPIONATE 50 MCG
1 SPRAY, SUSPENSION (ML) NASAL DAILY
COMMUNITY

## 2024-02-21 RX ORDER — ATORVASTATIN CALCIUM 40 MG/1
40 TABLET, FILM COATED ORAL DAILY
COMMUNITY
End: 2024-02-21 | Stop reason: HOSPADM

## 2024-02-21 RX ORDER — CEFDINIR 300 MG/1
300 CAPSULE ORAL 2 TIMES DAILY
COMMUNITY
End: 2024-02-21 | Stop reason: HOSPADM

## 2024-02-21 RX ORDER — LANOLIN ALCOHOL/MO/W.PET/CERES
1000 CREAM (GRAM) TOPICAL DAILY
COMMUNITY
End: 2024-02-21 | Stop reason: HOSPADM

## 2024-02-21 RX ORDER — METOPROLOL TARTRATE 25 MG/1
25 TABLET, FILM COATED ORAL 2 TIMES DAILY
COMMUNITY

## 2024-02-21 RX ORDER — LORATADINE 10 MG
10 TABLET,DISINTEGRATING ORAL DAILY
COMMUNITY

## 2024-02-21 RX ADMIN — POLYETHYLENE GLYCOL 3350 17 G: 17 POWDER, FOR SOLUTION ORAL at 10:02

## 2024-02-21 RX ADMIN — LOSARTAN POTASSIUM 100 MG: 50 TABLET, FILM COATED ORAL at 10:02

## 2024-02-21 RX ADMIN — CYANOCOBALAMIN TAB 1000 MCG 1000 MCG: 1000 TAB at 10:02

## 2024-02-21 RX ADMIN — AMLODIPINE BESYLATE 10 MG: 10 TABLET ORAL at 10:02

## 2024-02-21 RX ADMIN — HYDROXYZINE HYDROCHLORIDE 25 MG: 25 TABLET ORAL at 00:07

## 2024-02-21 RX ADMIN — INSULIN LISPRO 2 UNITS: 100 INJECTION, SOLUTION INTRAVENOUS; SUBCUTANEOUS at 12:07

## 2024-02-21 RX ADMIN — APIXABAN 5 MG: 5 TABLET, FILM COATED ORAL at 12:07

## 2024-02-21 RX ADMIN — APIXABAN 5 MG: 5 TABLET, FILM COATED ORAL at 00:07

## 2024-02-21 RX ADMIN — INSULIN LISPRO 2 UNITS: 100 INJECTION, SOLUTION INTRAVENOUS; SUBCUTANEOUS at 10:02

## 2024-02-21 RX ADMIN — FORMOTEROL FUMARATE DIHYDRATE 20 MCG: 20 SOLUTION RESPIRATORY (INHALATION) at 07:23

## 2024-02-21 RX ADMIN — METOPROLOL TARTRATE 25 MG: 25 TABLET, FILM COATED ORAL at 10:02

## 2024-02-21 RX ADMIN — ESCITALOPRAM OXALATE 20 MG: 20 TABLET, FILM COATED ORAL at 10:02

## 2024-02-21 RX ADMIN — BUDESONIDE INHALATION 0.5 MG: 0.5 SUSPENSION RESPIRATORY (INHALATION) at 07:23

## 2024-02-21 RX ADMIN — CEFTRIAXONE 2 G: 2 INJECTION, POWDER, FOR SOLUTION INTRAMUSCULAR; INTRAVENOUS at 10:07

## 2024-02-21 ASSESSMENT — PAIN - FUNCTIONAL ASSESSMENT: PAIN_FUNCTIONAL_ASSESSMENT: 0-10

## 2024-02-21 ASSESSMENT — PAIN SCALES - GENERAL
PAINLEVEL_OUTOF10: 0 - NO PAIN
PAINLEVEL_OUTOF10: 0 - NO PAIN

## 2024-02-21 ASSESSMENT — COGNITIVE AND FUNCTIONAL STATUS - GENERAL: DAILY ACTIVITIY SCORE: 24

## 2024-02-21 ASSESSMENT — ACTIVITIES OF DAILY LIVING (ADL): ADL_ASSISTANCE: INDEPENDENT

## 2024-02-21 NOTE — PROGRESS NOTES
Pharmacy Medication History Review    Caitlyn Juan is a 83 y.o. female admitted for Pneumonia of right lower lobe due to infectious organism. Pharmacy reviewed the patient's [id number] medications and allergies for accuracy.    The list below reflectives the updated PTA list. Please review each medication in order reconciliation for additional clarification and justification.       The list below reflectives the updated allergy list. Please review each documented allergy for additional clarification and justification.  Allergies  Reviewed by Erendira Fine RN on 2/17/2024        Severity Reactions Comments    Bactrim [sulfamethoxazole-trimethoprim] Not Specified Unknown     Flagyl [metronidazole] Not Specified Unknown             Below are additional concerns with the patient's PTA list.  Prior to Admission Medications   Prescriptions Last Dose Informant   LORazepam (Ativan) 1 mg tablet     Sig: Take 1 tablet (1 mg) by mouth as needed at bedtime for anxiety.   amLODIPine (Norvasc) 10 mg tablet     Sig: Take 1 tablet (10 mg) by mouth once daily.   apixaban (Eliquis) 5 mg tablet     Sig: Take 1 tablet (5 mg) by mouth 2 times a day.   atorvastatin (Lipitor) 40 mg tablet     Sig: Take 1 tablet (40 mg) by mouth once daily.   budesonide-formoteroL (Symbicort) 160-4.5 mcg/actuation inhaler     Sig: Inhale 2 puffs 2 times a day. Rinse mouth with water after use to reduce aftertaste and incidence of candidiasis. Do not swallow.   cefdinir (Omnicef) 300 mg capsule     Sig: Take 1 capsule (300 mg) by mouth 2 times a day. For 2 days   cyanocobalamin (Vitamin B-12) 1,000 mcg tablet     Sig: Take 1 tablet (1,000 mcg) by mouth once daily.   escitalopram (Lexapro) 20 mg tablet     Sig: Take 1 tablet (20 mg) by mouth once daily.   fluticasone (Flonase) 50 mcg/actuation nasal spray     Sig: Administer 1 spray into each nostril once daily. Shake gently. Before first use, prime pump. After use, clean tip and replace cap.    loratadine (Claritin Reditabs) 10 mg disintegrating tablet     Sig: Take 1 tablet (10 mg) by mouth once daily.   losartan (Cozaar) 100 mg tablet     Sig: Take 1 tablet (100 mg) by mouth once daily.   metoprolol tartrate (Lopressor) 25 mg tablet     Sig: Take 1 tablet (25 mg) by mouth 2 times a day.      Facility-Administered Medications: None        Mindi Miles CPhT

## 2024-02-21 NOTE — PROGRESS NOTES
Occupational Therapy    Evaluation/Treatment    Patient Name: Caitlyn Juan  MRN: [id number]  : 1940  Today's Date: 24  Time Calculation  Start Time: 1140  Stop Time: 1210  Time Calculation (min): 30 min       Assessment:  OT Assessment: Pt admitted with PNA from home. Pt is independent at baseline. Currently demonstrates decreased strength and endurance. Pt would benefit from low intenstity OT at d/c with assist of family/friends as needed.  Prognosis: Excellent  Evaluation/Treatment Tolerance: Patient tolerated treatment well (mild-moderately decreased endurance)  End of Session Communication: Bedside nurse  End of Session Patient Position: Bed, 2 rail up, Alarm on (RN at bedside administering medication)  OT Assessment Results: Decreased upper extremity strength, Decreased endurance, Decreased IADLs  Prognosis: Excellent  Evaluation/Treatment Tolerance: Patient tolerated treatment well (mild-moderately decreased endurance)  Plan:  Treatment Interventions: ADL retraining, Functional transfer training, Endurance training, UE strengthening/ROM  OT Frequency: 3 times per week  OT Discharge Recommendations: Low intensity level of continued care (assist of family/friends as needed)  Equipment Recommended upon Discharge: Wheeled walker  OT Recommended Transfer Status: Stand by assist  OT - OK to Discharge: Yes (OT eval complete and POC established)  Treatment Interventions: ADL retraining, Functional transfer training, Endurance training, UE strengthening/ROM    Subjective   Current Problem:  1. Pneumonia of right lower lobe due to infectious organism  amLODIPine (Norvasc) 10 mg tablet    apixaban (Eliquis) 5 mg tablet    atorvastatin (Lipitor) 40 mg tablet    escitalopram (Lexapro) 20 mg tablet    losartan (Cozaar) 100 mg tablet    cyanocobalamin (Vitamin B-12) 1,000 mcg tablet    cefdinir (Omnicef) 300 mg capsule        General:   OT Received On: 24  General  Reason for Referral: Pt presents with RLL  PNA  Referred By: James  Past Medical History Relevant to Rehab: PMHx: breast cancer, DM2, HTN, HLD, hypothyroidism, pAF on ac, vitamin B12 deficiency, depression/anxiety  Prior to Session Communication: Bedside nurse, Care Coordinator  Patient Position Received: Bed, 3 rail up, Alarm off, not on at start of session (notified RN alarm off at start of visit)  General Comment: Pt stating she feels too weak to go home and wants to go to nursing home. Educated pt on role of therapy and purpose of eval; pt verbalized understanding  Precautions:  Medical Precautions: Fall precautions    Pain:  Pain Assessment  Pain Assessment: 0-10  Pain Score: 0 - No pain    Objective   Cognition:  Overall Cognitive Status: Within Functional Limits  Orientation Level: Oriented X4    Home Living:  Type of Home: House  Lives With: Alone (pt has friends in the area but reports nobody can assist)  Home Adaptive Equipment: None  Home Layout: Two level, 1/2 bath on main level, Bed/bath upstairs, Stairs to alternate level with rails  Alternate Level Stairs-Rails:  (one)  Alternate Level Stairs-Number of Steps: 12  Home Access: Stairs to enter with rails  Entrance Stairs-Rails: Both  Entrance Stairs-Number of Steps: 3  Bathroom Shower/Tub: Tub/shower unit  Bathroom Toilet: Standard  Bathroom Equipment: Grab bars in shower  Home Living Comments: Pt denies being able to stay on first floor despite 1/2 bath and living room setup  Prior Function:  Level of Oste[place]: Independent with ADLs and functional transfers, Independent with homemaking with ambulation  ADL Assistance: Independent  Homemaking Assistance: Independent  Ambulatory Assistance: Independent  Leisure: Enjoys going out to dinner with friends  IADL History:  Homemaking Responsibilities: Yes  Meal Prep Responsibility: Primary  Laundry Responsibility: Primary  Cleaning Responsibility: Primary  Bill Paying/Finance Responsibility: Primary  Shopping Responsibility: Primary  Current  License: Yes  Mode of Transportation: Car  ADL:  Eating Assistance: Independent  Grooming Deficit: Supervision/safety, Wash/dry hands  Toileting Deficit: Grab bar use (distant supervision)    Activity Tolerance:  Endurance: Tolerates 10 - 20 min exercise with multiple rests    Bed Mobility/Transfers: Bed Mobility  Bed Mobility: Yes  Bed Mobility 1  Bed Mobility 1: Supine to sitting  Level of Assistance 1: Modified independent  Bed Mobility 2  Bed Mobility  2: Sitting to supine  Level of Assistance 2: Modified independent    Transfers  Transfer: Yes  Transfer 1  Transfer From 1: Sit to  Transfer to 1: Stand  Technique 1: Sit to stand  Transfer Level of Assistance 1: Distant supervision  Transfers 2  Technique 2: Stand to sit  Transfer Level of Assistance 2: Distant supervision  Transfers 3  Transfer From 3: Stand to  Transfer to 3: Toilet  Technique 3: Stand to sit  Transfer Level of Assistance 3: Distant supervision      Ambulation/Gait Training:  Ambulation/Gait Training  Ambulation/Gait Training Performed: Yes  Ambulation/Gait Training 1  Device 1: No device  Assistance 1: Close supervision  Quality of Gait 1: Narrow base of support  Comments/Distance (ft) 1: occasional LOB without AD, self corrected but required cueing for slower pace. Pt demo'd SOB after extended ambulation  Ambulation/Gait Training 2  Device 2: Rolling walker  Assistance 2: Independent  Comments/Distance (ft) 2: no LOB noted with RW, improved endurance (Trial completed with FWW following therapist education for use during transfers, benefits for balance and endurance. Pt performed with distant supervision and improvement in balance noted)    Sensation:  Light Touch: No apparent deficits  Strength:  Strength Comments: 4+/5 throughout all extremities    Coordination:  Movements are Fluid and Coordinated: Yes   Hand Function:  Hand Function  Gross Grasp: Functional  Coordination: Functional  Extremities: RUE   RUE : Within Functional Limits, LUE    LUE: Within Functional Limits, RLE   RLE : Within Functional Limits, and LLE   LLE : Within Functional Limits      Outcome Measures: [facility] Daily Activity  Putting on and taking off regular lower body clothing: None  Bathing (including washing, rinsing, drying): None  Putting on and taking off regular upper body clothing: None  Toileting, which includes using toilet, bedpan or urinal: None  Taking care of personal grooming such as brushing teeth: None  Eating Meals: None  Daily Activity - Total Score: 24        Education Documentation  Energy Conservation, taught by Maura Aleman OT at 2/21/2024  1:27 PM.  Learner: Patient  Readiness: Acceptance  Method: Explanation  Response: Verbalizes Understanding    Education Comments  No comments found.        Goals:  Encounter Problems       Encounter Problems (Active)       OT Goals       Pt will demo good endurance during ADLs, IADLs, and functional mobility with 0-3 rest breaks and good return demo of energy conservation techniques.  (Progressing)       Start:  02/21/24    Expected End:  03/06/24               OT Problem       Patient will complete meal/beverage prep, laundry activities, home management, and pet care with modified independent (Progressing)       Start:  02/21/24    Expected End:  03/06/24            PATIENT WILL MAINTAIN DYNAMIC STANDING BALANCE DURING ALL ADLS AND IADLS (Progressing)       Start:  02/21/24    Expected End:  03/06/24

## 2024-02-21 NOTE — ED PROVIDER NOTES
HPI   Chief Complaint   Patient presents with    Flu Symptoms       HPI: ***      Limitations to history: None  Independent Historians: Patient  External Records Reviewed: HIE, outpatient notes, inpatient notes  ------------------------------------------------------------------------------------------------------------------------------------------  ROS: a ten point review of systems was performed and was negative except as per HPI.  ------------------------------------------------------------------------------------------------------------------------------------------  PMH / PSH: as per HPI, otherwise reviewed in EMR  MEDS: as per HPI, otherwise reviewed in EMR  ALLERGIES: as per HPI, otherwise reviewed in EMR  SocH:  as per HPI, otherwise reviewed in EMR  FH:  as per HPI, otherwise reviewed in EMR  ------------------------------------------------------------------------------------------------------------------------------------------  Physical Exam:  VS: As documented in the triage note and EMR flowsheet from this visit was reviewed  General: Well appearing. No acute distress.   Eyes:  Extraocular movements grossly intact. No scleral icterus. No discharge  HEENT:  Normocephalic.  Atraumatic  Neck: Moves neck freely. No gross masses  CV: Regular rhythm. No murmurs, rubs or gallops   Resp: Clear to auscultation bilaterally. No respiratory distress.    GI: Soft, no masses, nontender. No rebound tenderness or guarding  MSK: Symmetric muscle bulk. No deformities. No lower extremity edema.    Skin: Warm, dry, intact.   Neuro: No focal deficits.  A&O x3.   Psych: Appropriate for situation  ------------------------------------------------------------------------------------------------------------------------------------------  Hospital Course / Medical Decision Making:  Independent Interpretations: ***  EKG as interpreted by me: ***    MDM: ***    Discussion of Management with Other Providers:   I discussed the  patient/results with: Emergency medicine team    Final diagnosis and disposition as below.    Results for orders placed or performed during the hospital encounter of 02/17/24  -Sars-CoV-2 and Influenza A/B PCR:        Result                      Value             Ref Range           Flu A Result                Not Detected      Not Detected        Flu B Result                Not Detected      Not Detected        Coronavirus 2019, PCR       Not Detected      Not Detected   -RSV PCR:        Result                      Value             Ref Range           RSV PCR                     Not Detected      Not Detected   -CBC and Auto Differential:        Result                      Value             Ref Range           WBC                         17.0 (H)          4.4 - 11.3 x*       nRBC                        0.0               0.0 - 0.0 /1*       RBC                         4.32              4.00 - 5.20 *       Hemoglobin                  12.4              12.0 - 16.0 *       Hematocrit                  38.4              36.0 - 46.0 %       MCV                         89                80 - 100 fL         MCH                         28.7              26.0 - 34.0 *       MCHC                        32.3              32.0 - 36.0 *       RDW                         13.8              11.5 - 14.5 %       Platelets                   260               150 - 450 x1*       Neutrophils %               85.4              40.0 - 80.0 %       Immature Granulocytes *     2.2 (H)           0.0 - 0.9 %         Lymphocytes %               5.4               13.0 - 44.0 %       Monocytes %                 5.2               2.0 - 10.0 %        Eosinophils %               1.3               0.0 - 6.0 %         Basophils %                 0.5               0.0 - 2.0 %         Neutrophils Absolute        14.48 (H)         1.60 - 5.50 *       Immature Granulocytes *     0.38              0.00 - 0.50 *       Lymphocytes Absolute        0.92               0.80 - 3.00 *       Monocytes Absolute          0.89 (H)          0.05 - 0.80 *       Eosinophils Absolute        0.22              0.00 - 0.40 *       Basophils Absolute          0.08              0.00 - 0.10 *  -Comprehensive metabolic panel:        Result                      Value             Ref Range           Glucose                     223 (H)           74 - 99 mg/dL       Sodium                      136               136 - 145 mm*       Potassium                   4.0               3.5 - 5.3 mm*       Chloride                    101               98 - 107 mmo*       Bicarbonate                 25                21 - 32 mmol*       Anion Gap                   14                10 - 20 mmol*       Urea Nitrogen               20                6 - 23 mg/dL        Creatinine                  1.03              0.50 - 1.05 *       eGFR                        54 (L)            >60 mL/min/1*       Calcium                     8.9               8.6 - 10.3 m*       Albumin                     3.9               3.4 - 5.0 g/*       Alkaline Phosphatase        73                33 - 136 U/L        Total Protein               6.6               6.4 - 8.2 g/*       AST                         11                9 - 39 U/L          Bilirubin, Total            0.4               0.0 - 1.2 mg*       ALT                         11                7 - 45 U/L     -Lipase:        Result                      Value             Ref Range           Lipase                      53                9 - 82 U/L     -Urinalysis with Reflex Microscopic:        Result                      Value             Ref Range           Color, Urine                Straw             Straw, Yellow       Appearance, Urine           Clear             Clear               Specific Gravity, Urine     1.040 (N)         1.005 - 1.035       pH, Urine                   7.0               5.0, 5.5, 6.*       Protein, Urine                                NEGATIVE  mg/*   >=500 (3+) (N)       Glucose, Urine              50 (1+) (A)       NEGATIVE mg/*       Blood, Urine                                  NEGATIVE        SMALL (1+) (A)       Ketones, Urine              5 (TRACE) (A)     NEGATIVE mg/*       Bilirubin, Urine            NEGATIVE          NEGATIVE            Urobilinogen, Urine         <2.0              <2.0 mg/dL          Nitrite, Urine              NEGATIVE          NEGATIVE            Leukocyte Esterase, Ur*     NEGATIVE          NEGATIVE       -Microscopic Only, Urine:        Result                      Value             Ref Range           WBC, Urine                  1-5               1-5, NONE /H*       RBC, Urine                  3-5               NONE, 1-2, 3*  -Hemoglobin A1C:        Result                      Value             Ref Range           Hemoglobin A1C              7.9 (H)           see below %         Estimated Average Gluc*     180               Not Establis*  -TSH:        Result                      Value             Ref Range           Thyroid Stimulating Ho*     1.41              0.44 - 3.98 *  -CBC and Auto Differential:        Result                      Value             Ref Range           WBC                         15.2 (H)          4.4 - 11.3 x*       nRBC                        0.0               0.0 - 0.0 /1*       RBC                         3.56 (L)          4.00 - 5.20 *       Hemoglobin                  10.0 (L)          12.0 - 16.0 *       Hematocrit                  31.3 (L)          36.0 - 46.0 %       MCV                         88                80 - 100 fL         MCH                         28.1              26.0 - 34.0 *       MCHC                        31.9 (L)          32.0 - 36.0 *       RDW                         14.1              11.5 - 14.5 %       Platelets                   214               150 - 450 x1*       Neutrophils %               74.0              40.0 - 80.0 %       Immature Granulocytes *     0.6                0.0 - 0.9 %         Lymphocytes %               15.5              13.0 - 44.0 %       Monocytes %                 9.0               2.0 - 10.0 %        Eosinophils %               0.5               0.0 - 6.0 %         Basophils %                 0.4               0.0 - 2.0 %         Neutrophils Absolute        11.28 (H)         1.60 - 5.50 *       Immature Granulocytes *     0.09              0.00 - 0.50 *       Lymphocytes Absolute        2.36              0.80 - 3.00 *       Monocytes Absolute          1.37 (H)          0.05 - 0.80 *       Eosinophils Absolute        0.07              0.00 - 0.40 *       Basophils Absolute          0.06              0.00 - 0.10 *  -Basic metabolic panel:        Result                      Value             Ref Range           Glucose                     118 (H)           74 - 99 mg/dL       Sodium                      136               136 - 145 mm*       Potassium                   4.2               3.5 - 5.3 mm*       Chloride                    102               98 - 107 mmo*       Bicarbonate                 26                21 - 32 mmol*       Anion Gap                   12                10 - 20 mmol*       Urea Nitrogen               25 (H)            6 - 23 mg/dL        Creatinine                  1.24 (H)          0.50 - 1.05 *       eGFR                        43 (L)            >60 mL/min/1*       Calcium                     7.8 (L)           8.6 - 10.3 m*  -Magnesium:        Result                      Value             Ref Range           Magnesium                   1.30 (L)          1.60 - 2.40 *  -CBC and Auto Differential:        Result                      Value             Ref Range           WBC                         12.0 (H)          4.4 - 11.3 x*       nRBC                        0.0               0.0 - 0.0 /1*       RBC                         3.93 (L)          4.00 - 5.20 *       Hemoglobin                  10.9 (L)          12.0 - 16.0 *        Hematocrit                  35.3 (L)          36.0 - 46.0 %       MCV                         90                80 - 100 fL         MCH                         27.7              26.0 - 34.0 *       MCHC                        30.9 (L)          32.0 - 36.0 *       RDW                         14.3              11.5 - 14.5 %       Platelets                   239               150 - 450 x1*       Neutrophils %               85.6              40.0 - 80.0 %       Immature Granulocytes *     0.7               0.0 - 0.9 %         Lymphocytes %               7.3               13.0 - 44.0 %       Monocytes %                 4.0               2.0 - 10.0 %        Eosinophils %               2.0               0.0 - 6.0 %         Basophils %                 0.4               0.0 - 2.0 %         Neutrophils Absolute        10.26 (H)         1.60 - 5.50 *       Immature Granulocytes *     0.08              0.00 - 0.50 *       Lymphocytes Absolute        0.88              0.80 - 3.00 *       Monocytes Absolute          0.48              0.05 - 0.80 *       Eosinophils Absolute        0.24              0.00 - 0.40 *       Basophils Absolute          0.05              0.00 - 0.10 *  -Basic metabolic panel:        Result                      Value             Ref Range           Glucose                     180 (H)           74 - 99 mg/dL       Sodium                      134 (L)           136 - 145 mm*       Potassium                   4.0               3.5 - 5.3 mm*       Chloride                    102               98 - 107 mmo*       Bicarbonate                 22                21 - 32 mmol*       Anion Gap                   14                10 - 20 mmol*       Urea Nitrogen               22                6 - 23 mg/dL        Creatinine                  1.01              0.50 - 1.05 *       eGFR                        55 (L)            >60 mL/min/1*       Calcium                     7.9 (L)           8.6 - 10.3 m*  -CBC and Auto  Differential:        Result                      Value             Ref Range           WBC                         10.1              4.4 - 11.3 x*       nRBC                        0.0               0.0 - 0.0 /1*       RBC                         3.68 (L)          4.00 - 5.20 *       Hemoglobin                  10.3 (L)          12.0 - 16.0 *       Hematocrit                  32.4 (L)          36.0 - 46.0 %       MCV                         88                80 - 100 fL         MCH                         28.0              26.0 - 34.0 *       MCHC                        31.8 (L)          32.0 - 36.0 *       RDW                         14.1              11.5 - 14.5 %       Platelets                   241               150 - 450 x1*       Neutrophils %               83.6              40.0 - 80.0 %       Immature Granulocytes *     0.8               0.0 - 0.9 %         Lymphocytes %               8.1               13.0 - 44.0 %       Monocytes %                 6.9               2.0 - 10.0 %        Eosinophils %               0.2               0.0 - 6.0 %         Basophils %                 0.4               0.0 - 2.0 %         Neutrophils Absolute        8.47 (H)          1.60 - 5.50 *       Immature Granulocytes *     0.08              0.00 - 0.50 *       Lymphocytes Absolute        0.82              0.80 - 3.00 *       Monocytes Absolute          0.70              0.05 - 0.80 *       Eosinophils Absolute        0.02              0.00 - 0.40 *       Basophils Absolute          0.04              0.00 - 0.10 *  -Basic metabolic panel:        Result                      Value             Ref Range           Glucose                     168 (H)           74 - 99 mg/dL       Sodium                      134 (L)           136 - 145 mm*       Potassium                   3.6               3.5 - 5.3 mm*       Chloride                    101               98 - 107 mmo*       Bicarbonate                 24                21 - 32  mmol*       Anion Gap                   13                10 - 20 mmol*       Urea Nitrogen               21                6 - 23 mg/dL        Creatinine                  1.10 (H)          0.50 - 1.05 *       eGFR                        50 (L)            >60 mL/min/1*       Calcium                     7.8 (L)           8.6 - 10.3 m*  -CBC and Auto Differential:        Result                      Value             Ref Range           WBC                         8.3               4.4 - 11.3 x*       nRBC                        0.0               0.0 - 0.0 /1*       RBC                         3.84 (L)          4.00 - 5.20 *       Hemoglobin                  10.8 (L)          12.0 - 16.0 *       Hematocrit                  31.4 (L)          36.0 - 46.0 %       MCV                         82                80 - 100 fL         MCH                         28.1              26.0 - 34.0 *       MCHC                        34.4              32.0 - 36.0 *       RDW                         13.7              11.5 - 14.5 %       Platelets                   244               150 - 450 x1*       Neutrophils %               80.0              40.0 - 80.0 %       Immature Granulocytes *     0.6               0.0 - 0.9 %         Lymphocytes %               7.8               13.0 - 44.0 %       Monocytes %                 8.7               2.0 - 10.0 %        Eosinophils %               2.4               0.0 - 6.0 %         Basophils %                 0.5               0.0 - 2.0 %         Neutrophils Absolute        6.66 (H)          1.60 - 5.50 *       Immature Granulocytes *     0.05              0.00 - 0.50 *       Lymphocytes Absolute        0.65 (L)          0.80 - 3.00 *       Monocytes Absolute          0.72              0.05 - 0.80 *       Eosinophils Absolute        0.20              0.00 - 0.40 *       Basophils Absolute          0.04              0.00 - 0.10 *  -Basic metabolic panel:        Result                      Value              Ref Range           Glucose                     175 (H)           74 - 99 mg/dL       Sodium                      135 (L)           136 - 145 mm*       Potassium                   3.5               3.5 - 5.3 mm*       Chloride                    101               98 - 107 mmo*       Bicarbonate                 22                21 - 32 mmol*       Anion Gap                   16                10 - 20 mmol*       Urea Nitrogen               19                6 - 23 mg/dL        Creatinine                  0.92              0.50 - 1.05 *       eGFR                        62                >60 mL/min/1*       Calcium                     8.1 (L)           8.6 - 10.3 m*  -POCT GLUCOSE:        Result                      Value             Ref Range           POCT Glucose                137 (H)           74 - 99 mg/dL  -POCT GLUCOSE:        Result                      Value             Ref Range           POCT Glucose                195 (H)           74 - 99 mg/dL  -POCT GLUCOSE:        Result                      Value             Ref Range           POCT Glucose                202 (H)           74 - 99 mg/dL  -POCT GLUCOSE:        Result                      Value             Ref Range           POCT Glucose                114 (H)           74 - 99 mg/dL  -POCT GLUCOSE:        Result                      Value             Ref Range           POCT Glucose                185 (H)           74 - 99 mg/dL  -POCT GLUCOSE:        Result                      Value             Ref Range           POCT Glucose                184 (H)           74 - 99 mg/dL  -POCT GLUCOSE:        Result                      Value             Ref Range           POCT Glucose                203 (H)           74 - 99 mg/dL  -POCT GLUCOSE:        Result                      Value             Ref Range           POCT Glucose                218 (H)           74 - 99 mg/dL  -POCT GLUCOSE:        Result                      Value              Ref Range           POCT Glucose                202 (H)           74 - 99 mg/dL  -POCT GLUCOSE:        Result                      Value             Ref Range           POCT Glucose                181 (H)           74 - 99 mg/dL  -POCT GLUCOSE:        Result                      Value             Ref Range           POCT Glucose                165 (H)           74 - 99 mg/dL  -POCT GLUCOSE:        Result                      Value             Ref Range           POCT Glucose                207 (H)           74 - 99 mg/dL  -POCT GLUCOSE:        Result                      Value             Ref Range           POCT Glucose                187 (H)           74 - 99 mg/dL  -POCT GLUCOSE:        Result                      Value             Ref Range           POCT Glucose                191 (H)           74 - 99 mg/dL  -POCT GLUCOSE:        Result                      Value             Ref Range           POCT Glucose                172 (H)           74 - 99 mg/dL  XR abdomen 1 view   Final Result    Moderate retained colonic stool throughout.          Punctate left renal stone.          Previous cholecystectomy.          MACRO:    None          Signed by: Francisco J Longo 2/19/2024 12:32 PM    Dictation workstation:   [id number]     XR chest 1 view   Final Result    No acute process.    Signed by Elliot Stewart MD     CT abdomen pelvis w IV contrast   Final Result    Diverticulosis without diverticulitis.  There is a large amount of    stool in the rectum.    Right lower lobe infiltrate.  This does have a nodular component and    follow-up after treatment to exclude a pulmonary nodule is    recommended.    Nonobstructing left renal calculus.  There is thickening of the wall    of the left renal pelvis and correlation with the patient's urinalysis    to exclude infection is recommended.    Signed by Aravind Nieves MD                               [place] Coma Scale Score: 15                     Patient History   Past Medical  History:   Diagnosis Date    Disease of tongue, unspecified     Disorder of tongue    Diverticulitis of intestine, part unspecified, without perforation or abscess without bleeding 12/01/2016    Acute diverticulitis    Encounter for follow-up examination after completed treatment for malignant neoplasm 04/30/2015    Encounter for follow-up surveillance of breast cancer    Malignant neoplasm of lower-outer quadrant of unspecified female breast (CMS/HCC) 04/30/2019    Malignant neoplasm of lower-outer quadrant of female breast    Other conditions influencing health status     Malignant Female Breast Neoplasm Of The Upper Outer Quadrant    Personal history of diseases of the blood and blood-forming organs and certain disorders involving the immune mechanism 02/05/2015    History of iron deficiency anemia    Personal history of diseases of the skin and subcutaneous tissue     History of cyst of breast    Personal history of other (healed) physical injury and trauma 02/18/2014    History of whiplash injury to neck    Personal history of other diseases of the respiratory system 05/11/2015    History of sinusitis    Personal history of other endocrine, nutritional and metabolic disease     History of diabetes mellitus     Past Surgical History:   Procedure Laterality Date    BREAST LUMPECTOMY  08/28/2013    Left Breast Lumpectomy    CATARACT EXTRACTION  02/18/2014    Cataract Extraction    GALLBLADDER SURGERY  11/04/2013    Gallbladder Surgery    OTHER SURGICAL HISTORY  08/28/2013    Bx Breast Percutan Needle Core Use Imag Guide (Stereotactic)    OTHER SURGICAL HISTORY  09/18/2014    Oophorectomy For Ectopic Pregnancy Right Ovary     No family history on file.  Social History     Tobacco Use    Smoking status: Never    Smokeless tobacco: Never   Substance Use Topics    Alcohol use: Not on file    Drug use: Not on file       Physical Exam   ED Triage Vitals   Temp Heart Rate Resp BP   02/17/24 1127 02/17/24 1127 02/17/24  1127 02/17/24 1127   36.9 °C (98.4 °F) 90 16 (!) 199/69      SpO2 Temp Source Heart Rate Source Patient Position   02/17/24 1127 02/17/24 1127 02/17/24 1151 02/18/24 0332   95 % Oral Monitor Lying      BP Location FiO2 (%)     02/18/24 0332 --     Right arm        Physical Exam    ED Course & MDM   Diagnoses as of 02/21/24 1753   Pneumonia of right lower lobe due to infectious organism       Medical Decision Making      Procedure  Procedures

## 2024-02-21 NOTE — DISCHARGE SUMMARY
Discharge Diagnosis  Pneumonia of right lower lobe due to infectious organism    Issues Requiring Follow-Up  PCP     Discharge Meds     Your medication list        CHANGE how you take these medications        Instructions Last Dose Given Next Dose Due   apixaban 5 mg tablet  Commonly known as: Eliquis  What changed: when to take this      Take 1 tablet (5 mg) by mouth every 12 hours.       atorvastatin 40 mg tablet  Commonly known as: Lipitor  What changed: when to take this      Take 1 tablet (40 mg) by mouth once daily at bedtime.       cyanocobalamin 1,000 mcg tablet  Commonly known as: Vitamin B-12  What changed: Another medication with the same name was added. Make sure you understand how and when to take each.           cyanocobalamin 1,000 mcg tablet  Commonly known as: Vitamin B-12  What changed: You were already taking a medication with the same name, and this prescription was added. Make sure you understand how and when to take each.      Take 1 tablet (1,000 mcg) by mouth once daily. Do not start before February 20, 2024.       escitalopram 20 mg tablet  Commonly known as: Lexapro  What changed: Another medication with the same name was added. Make sure you understand how and when to take each.           escitalopram 20 mg tablet  Commonly known as: Lexapro  What changed: You were already taking a medication with the same name, and this prescription was added. Make sure you understand how and when to take each.      Take 1 tablet (20 mg) by mouth once daily. Do not start before February 20, 2024.       losartan 100 mg tablet  Commonly known as: Cozaar  What changed: Another medication with the same name was added. Make sure you understand how and when to take each.           losartan 100 mg tablet  Commonly known as: Cozaar  What changed: You were already taking a medication with the same name, and this prescription was added. Make sure you understand how and when to take each.      Take 1 tablet (100 mg)  by mouth once daily. Do not start before February 20, 2024.              CONTINUE taking these medications        Instructions Last Dose Given Next Dose Due   amLODIPine 10 mg tablet  Commonly known as: Norvasc      Take 1 tablet (10 mg) by mouth once daily.       fluticasone 50 mcg/actuation nasal spray  Commonly known as: Flonase           loratadine 10 mg disintegrating tablet  Commonly known as: Claritin Reditabs           LORazepam 1 mg tablet  Commonly known as: Ativan           metoprolol tartrate 25 mg tablet  Commonly known as: Lopressor           Symbicort 160-4.5 mcg/actuation inhaler  Generic drug: budesonide-formoteroL                  STOP taking these medications      cefdinir 300 mg capsule  Commonly known as: Omnicef                  Where to Get Your Medications        These medications were sent to [facility]  [address], [address], [place] [address]      Hours: 8 AM to 6 PM Mon-Fri, 9 AM to 1 PM Saturday Phone: [phone]   amLODIPine 10 mg tablet       Information about where to get these medications is not yet available    Ask your nurse or doctor about these medications  apixaban 5 mg tablet  atorvastatin 40 mg tablet  cyanocobalamin 1,000 mcg tablet  escitalopram 20 mg tablet  losartan 100 mg tablet         Test Results Pending At Discharge  Pending Labs       No current pending labs.            Procedures       Hospital Course   Caitlyn is in the hospital with pneumonia.  She did finish a full course of antibiotics here.  She did initially require oxygen but this was able to be weaned off without any difficulty.  At this time she is otherwise hemodynamic stable appropriate for discharge.  She will going home with home health care.  She will need to follow-up closely with her primary care physician.  I have sent a prescription for a wheeled walker for which she will go home with.  This plan was discussed with her and she is in agreement.  Options were answered.    Blood  "pressure 167/73, pulse 75, temperature 36.7 °C (98.1 °F), temperature source Oral, resp. rate 18, height 1.626 m (5' 4\"), weight 69.9 kg (154 lb), SpO2 92 %.  Pertinent Physical Exam At Time of Discharge  Physical Exam  Constitutional:       Appearance: Normal appearance.   HENT:      Head: Normocephalic.      Nose: Nose normal.      Mouth/Throat:      Mouth: Mucous membranes are dry.      Pharynx: Oropharynx is clear.   Cardiovascular:      Rate and Rhythm: Normal rate.   Pulmonary:      Effort: Pulmonary effort is normal.      Breath sounds: Normal breath sounds.   Abdominal:      General: Abdomen is flat. Bowel sounds are normal.      Palpations: Abdomen is soft.   Skin:     General: Skin is warm and dry.      Capillary Refill: Capillary refill takes less than 2 seconds.   Neurological:      General: No focal deficit present.      Mental Status: She is alert.         Outpatient Follow-Up  Future Appointments   Date Time Provider Department Center   4/9/2024  2:00 PM Virgie Moreau MD [id number]         Elliot Ramirez DO FACP     Time spent during this discharge: >30 min        "

## 2024-02-21 NOTE — CARE PLAN
The patient's goals for the shift include      The clinical goals for the shift include maintain safety

## 2024-02-21 NOTE — DOCUMENTATION CLARIFICATION NOTE
"    PATIENT:               NAVNEET LOGAN  ACCT #:                  [id number]  MRN:                       [id number]  :                       1940  ADMIT DATE:       2024 11:17 AM  DISCH DATE:  RESPONDING PROVIDER #:        [id number]          PROVIDER RESPONSE TEXT:    Acute Kidney Injury is ruled out    CDI QUERY TEXT:    UH_CV BELEM      Instruction:    Based on your assessment of the patient and the clinical information, please provide the requested documentation by clicking on the appropriate radio button and enter any additional information if prompted.    Question: Please clarify the diagnosis of Acute Kidney Injury    When answering this query, please exercise your independent professional judgment. The fact that a question is being asked, does not imply that any particular answer is desired or expected.    The patient's clinical indicators include:  Clinical Information: Patient admitted with RLL PNA    Documented Diagnosis: 24 Medicine Progress note and subsequent notes: \"BELEM\"      Clinical Indicators and Documentation:  -Vital Signs-ED triage: Temp-36.9, HR-90, RR-16, BP-199/69, SpO2-95 percent  -Baseline Creatinine: unknown but lowest is 1.01 during this time  -SCr lab values: from 24-1.03, 1.24, 1.01, 1.10  -Urine Output: not recorded  -Electrolyte lab values: 24, Sodium-136, Potassium-4.2, Chloride-102, Calcium-7.8  -Nephrology consult: No    Treatment: IVF's    Risk Factors: IV contrast, abx  Options provided:  -- Acute Kidney Injury is ruled out  -- Acute Kidney Injury ruled in as evidenced by, Please specify additional information below  -- Other - I will add my own diagnosis  -- Refer to Clinical Documentation Reviewer    Query created by: Priscilla Avila on 2024 3:26 PM      Electronically signed by:  MK MEADOWS DO 2024 7:51 AM          "

## 2024-02-21 NOTE — PROGRESS NOTES
Spiritual Care Visit    Clinical Encounter Type  Visited With: Patient  Routine Visit: Follow-up  Continue Visiting: No    Pt stated that she wanted one more day, but seemingly accepted that it was better to be discharged today for the betterment of her health.  The SW did follow through in helping her to have help over night at home.   She stated that she feels better about going home because she would have help.  The pt was given a prayer blanket and said that she appreciated the blanket and the visit.  Follow up will not be needed at this time due to preparation for being discharged.     Chaplain Joey Shine

## 2024-02-21 NOTE — PROGRESS NOTES
Care Coordinator Note:    Plan: Patient treated for PNA.  IV atb discontinued. Patient is med ready for dc but is complaing of feeling weak and wanted PT OT to eval prior to DC.   Status: Inpatient  Payor: Medicare  [facility]  Disposition: home with [facility] vs NEW SNF. Will follow for PT OT recs.   Barrier:  ADOD: today    1519- PT OT rec LOW. Will dispense a wheeled walker. Patient will need UBER home. [facility] for RN PT OT were sent to [facility]. Will dc home this evening.     Cynthia Sheffield [facility]      02/21/24 1146   Discharge Planning   Patient expects to be discharged to: Home with [facility] vs NEW SNF. PT OT IS PENDING 2/21

## 2024-02-21 NOTE — PROGRESS NOTES
"Caitlyn Juan is a 83 y.o. female on day 4 of admission presenting with Pneumonia of right lower lobe due to infectious organism.    Subjective   Resp evaled does not need O2. Feeling not quite to her baseline. Very concerned about going home. Notes she does not want to go home.        Objective     Physical Exam  Constitutional:       Appearance: Normal appearance.   HENT:      Head: Normocephalic.      Nose: Nose normal.      Mouth/Throat:      Mouth: Mucous membranes are dry.      Pharynx: Oropharynx is clear.   Cardiovascular:      Rate and Rhythm: Normal rate.   Pulmonary:      Effort: Pulmonary effort is normal.      Breath sounds: Normal breath sounds.   Abdominal:      General: Abdomen is flat. Bowel sounds are normal.      Palpations: Abdomen is soft.   Skin:     General: Skin is warm and dry.      Capillary Refill: Capillary refill takes less than 2 seconds.   Neurological:      General: No focal deficit present.      Mental Status: She is alert.         Last Recorded Vitals  Blood pressure 159/89, pulse 65, temperature 36.9 °C (98.4 °F), temperature source Temporal, resp. rate 17, height 1.626 m (5' 4\"), weight 69.9 kg (154 lb), SpO2 92 %.  Intake/Output last 3 Shifts:  No intake/output data recorded.    Relevant Results  Results for orders placed or performed during the hospital encounter of 02/17/24 (from the past 24 hour(s))   POCT GLUCOSE   Result Value Ref Range    POCT Glucose 165 (H) 74 - 99 mg/dL   POCT GLUCOSE   Result Value Ref Range    POCT Glucose 207 (H) 74 - 99 mg/dL   CBC and Auto Differential   Result Value Ref Range    WBC 8.3 4.4 - 11.3 x10*3/uL    nRBC 0.0 0.0 - 0.0 /100 WBCs    RBC 3.84 (L) 4.00 - 5.20 x10*6/uL    Hemoglobin 10.8 (L) 12.0 - 16.0 g/dL    Hematocrit 31.4 (L) 36.0 - 46.0 %    MCV 82 80 - 100 fL    MCH 28.1 26.0 - 34.0 pg    MCHC 34.4 32.0 - 36.0 g/dL    RDW 13.7 11.5 - 14.5 %    Platelets 244 150 - 450 x10*3/uL    Neutrophils % 80.0 40.0 - 80.0 %    Immature Granulocytes " %, Automated 0.6 0.0 - 0.9 %    Lymphocytes % 7.8 13.0 - 44.0 %    Monocytes % 8.7 2.0 - 10.0 %    Eosinophils % 2.4 0.0 - 6.0 %    Basophils % 0.5 0.0 - 2.0 %    Neutrophils Absolute 6.66 (H) 1.60 - 5.50 x10*3/uL    Immature Granulocytes Absolute, Automated 0.05 0.00 - 0.50 x10*3/uL    Lymphocytes Absolute 0.65 (L) 0.80 - 3.00 x10*3/uL    Monocytes Absolute 0.72 0.05 - 0.80 x10*3/uL    Eosinophils Absolute 0.20 0.00 - 0.40 x10*3/uL    Basophils Absolute 0.04 0.00 - 0.10 x10*3/uL   Basic metabolic panel   Result Value Ref Range    Glucose 175 (H) 74 - 99 mg/dL    Sodium 135 (L) 136 - 145 mmol/L    Potassium 3.5 3.5 - 5.3 mmol/L    Chloride 101 98 - 107 mmol/L    Bicarbonate 22 21 - 32 mmol/L    Anion Gap 16 10 - 20 mmol/L    Urea Nitrogen 19 6 - 23 mg/dL    Creatinine 0.92 0.50 - 1.05 mg/dL    eGFR 62 >60 mL/min/1.73m*2    Calcium 8.1 (L) 8.6 - 10.3 mg/dL   POCT GLUCOSE   Result Value Ref Range    POCT Glucose 187 (H) 74 - 99 mg/dL   POCT GLUCOSE   Result Value Ref Range    POCT Glucose 191 (H) 74 - 99 mg/dL       Imaging Results  Cxr:  IMPRESSION:  No acute process    Ct abd/pelvis:    IMPRESSION:  Diverticulosis without diverticulitis.  There is a large amount of  stool in the rectum.  Right lower lobe infiltrate.  This does have a nodular component and  follow-up after treatment to exclude a pulmonary nodule is  recommended.  Nonobstructing left renal calculus.  There is thickening of the wall  of the left renal pelvis and correlation with the patient's urinalysis  to exclude infection is recommended.    Medications:  amLODIPine, 10 mg, oral, Daily  apixaban, 5 mg, oral, q12h  atorvastatin, 40 mg, oral, Nightly  budesonide, 0.5 mg, nebulization, BID  cyanocobalamin, 1,000 mcg, oral, Daily  escitalopram, 20 mg, oral, Daily  formoterol, 20 mcg, nebulization, BID  HYDROmorphone, 0.2 mg, intravenous, Once  insulin lispro, 0-10 Units, subcutaneous, TID with meals  losartan, 100 mg, oral, Daily  melatonin, 3 mg, oral,  Daily  metoprolol tartrate, 25 mg, oral, BID  polyethylene glycol, 17 g, oral, Daily       PRN medications: acetaminophen, acetaminophen, benzonatate, dextrose 10 % in water (D10W), dextrose, glucagon, guaiFENesin, guaiFENesin, hydrOXYzine HCL, ipratropium-albuteroL, ondansetron ODT **OR** ondansetron, oxygen     Assessment/Plan   RLL PNA  - continue azithromycin and iv ceftriaxone last day     2.PAF  -on eliquis    3. BELEM  -resolved    4. DMII  -SSI    5. Nausea  -r/o ileus  -pending abd  xray    Dispo: dc in am  DVT Prophylaxis:  Parris Ramirez, [facility]

## 2024-04-09 ENCOUNTER — APPOINTMENT (OUTPATIENT)
Dept: DERMATOLOGY | Facility: CLINIC | Age: 84
End: 2024-04-09
Payer: MEDICARE

## 2024-04-19 ENCOUNTER — APPOINTMENT (OUTPATIENT)
Dept: PRIMARY CARE | Facility: CLINIC | Age: 84
End: 2024-04-19
Payer: MEDICARE

## 2024-05-29 ENCOUNTER — APPOINTMENT (OUTPATIENT)
Dept: GASTROENTEROLOGY | Facility: CLINIC | Age: 84
End: 2024-05-29
Payer: MEDICARE

## 2024-06-27 ENCOUNTER — APPOINTMENT (OUTPATIENT)
Dept: GASTROENTEROLOGY | Facility: CLINIC | Age: 84
End: 2024-06-27
Payer: MEDICARE

## 2024-06-28 ENCOUNTER — APPOINTMENT (OUTPATIENT)
Dept: GASTROENTEROLOGY | Facility: CLINIC | Age: 84
End: 2024-06-28
Payer: COMMERCIAL

## 2024-08-08 ENCOUNTER — APPOINTMENT (OUTPATIENT)
Dept: GASTROENTEROLOGY | Facility: CLINIC | Age: 84
End: 2024-08-08
Payer: MEDICARE

## 2024-08-08 VITALS — HEIGHT: 64 IN | HEART RATE: 76 BPM | WEIGHT: 147 LBS | BODY MASS INDEX: 25.1 KG/M2

## 2024-08-08 DIAGNOSIS — K59.09 CHRONIC CONSTIPATION WITH OVERFLOW: Primary | ICD-10-CM

## 2024-08-08 PROCEDURE — 99203 OFFICE O/P NEW LOW 30 MIN: CPT

## 2024-08-08 PROCEDURE — 1159F MED LIST DOCD IN RCRD: CPT

## 2024-08-08 RX ORDER — POLYETHYLENE GLYCOL 3350 17 G/17G
17 POWDER, FOR SOLUTION ORAL DAILY
Qty: 1700 G | Refills: 11 | Status: SHIPPED | OUTPATIENT
Start: 2024-08-08

## 2024-08-08 RX ORDER — SYRING-NEEDL,DISP,INSUL,0.3 ML 29 G X1/2"
296 SYRINGE, EMPTY DISPOSABLE MISCELLANEOUS ONCE
Qty: 296 ML | Refills: 0 | Status: SHIPPED | OUTPATIENT
Start: 2024-08-08 | End: 2024-08-08

## 2024-08-08 ASSESSMENT — ENCOUNTER SYMPTOMS
SHORTNESS OF BREATH: 0
APPETITE CHANGE: 0
FATIGUE: 0
ANAL BLEEDING: 0
NAUSEA: 0
CHILLS: 0
ABDOMINAL PAIN: 0
FEVER: 0
DIARRHEA: 1
ABDOMINAL DISTENTION: 1
COUGH: 0
TROUBLE SWALLOWING: 0
VOMITING: 0
RECTAL PAIN: 0
BLOOD IN STOOL: 0
CONSTIPATION: 0

## 2024-08-08 NOTE — PATIENT INSTRUCTIONS
Please drink Magnesium Citrate to help cleanse the bowels, then the next day, I recommend taking 1 capful of Miralax daily in 8 oz of liquid.  This is to help move bowels and soften stool.  I suspect you have constipation with overflow diarrhea.  I recommend increasing water intake and trying to decrease caffeine. Try to increase activity.  Contact me if you have rectal bleeding again and will consider a suppository to help shrink your hemorrhoids.    Follow up as needed

## 2024-08-08 NOTE — ASSESSMENT & PLAN NOTE
Symptoms most consistent with constipation and overflow diarrhea.  Less likely infectious stool, consider microscopic colitis  -Magnesium citrate cleanse followed by daily MiraLAX  -Consider Anusol suppositories if patient notices rectal bleeding again  -Recommended patient decrease caffeine and increase water intake, increase activity

## 2024-08-08 NOTE — PROGRESS NOTES
Subjective     History of Present Illness:   Caitlyn Juan is a 84 y.o. female with PMHx of asthma, GERD, diabetes, left breast cancer, stroke, A-fib on Eliquis, HTN, IBS-D who presents to GI clinic for further evaluation of passing water    Today, patient reports after straining to move bowels, had 1 episode of small amount of bright red blood in toilet bowel that she attributes to her internal hemorrhoids.  Has had liquid stools for years 1-3 times daily, will have formed stool every few weeks.  States she is following up for her February visit at the hospital.  She was given MiraLAX there but did not feel like she needed it since she was having liquid stool.  Drinks a lot of caffeine like iced tea and pepsi.  Is not very active.  She denies any other GI complaints today.  Denies constipation, dyspepsia, melena, hematochezia, dysphagia, unintentional weight loss  2/2024 CT and x-ray of abdomen suggest constipation    Denies ETOH, smoking, marijuana  Denies fxh GI cancer or IBD  Abdominal Surgeries: john    Last colonoscopy 1/2015 for WANDA: Diverticulosis, internal hemorrhoids  Last EGD 1/2015 for WANDA: Hemorrhagic gastritis and erosions, gastric polyps.  Repeat 1 year      Past Medical History  As per HPI.     Social History  she  reports that she has never smoked. She has never used smokeless tobacco.     Family History  her family history is not on file.     Review of Systems  Review of Systems   Constitutional:  Negative for appetite change, chills, fatigue and fever.   HENT:  Negative for trouble swallowing.    Respiratory:  Negative for cough and shortness of breath.    Gastrointestinal:  Positive for abdominal distention and diarrhea. Negative for abdominal pain, anal bleeding, blood in stool, constipation, nausea, rectal pain and vomiting.       Allergies  Allergies   Allergen Reactions    Bactrim [Sulfamethoxazole-Trimethoprim] Unknown    Flagyl [Metronidazole] Unknown       Medications  Current Outpatient  Medications   Medication Instructions    amLODIPine (NORVASC) 10 mg, oral, Daily    apixaban (ELIQUIS) 5 mg, oral, Every 12 hours    atorvastatin (LIPITOR) 40 mg, oral, Nightly    budesonide-formoteroL (Symbicort) 160-4.5 mcg/actuation inhaler 2 puffs, inhalation, 2 times daily RT, Rinse mouth with water after use to reduce aftertaste and incidence of candidiasis. Do not swallow.    escitalopram (LEXAPRO) 20 mg, oral, Daily    fluticasone (Flonase) 50 mcg/actuation nasal spray 1 spray, Each Nostril, Daily, Shake gently. Before first use, prime pump. After use, clean tip and replace cap.    loratadine (CLARITIN REDITABS) 10 mg, oral, Daily    LORazepam (ATIVAN) 1 mg, oral, Nightly PRN    losartan (COZAAR) 100 mg, oral, Daily    magnesium citrate solution 296 mL, oral, Once    metoprolol tartrate (LOPRESSOR) 25 mg, oral, 2 times daily    polyethylene glycol (MIRALAX) 17 g, oral, Daily, Mix 1 capful in 8 oz of liquid        Objective   Visit Vitals  Pulse 76      Physical Exam  Constitutional:       Appearance: Normal appearance. She is normal weight.   HENT:      Mouth/Throat:      Mouth: Mucous membranes are dry.      Pharynx: Oropharynx is clear.   Cardiovascular:      Rate and Rhythm: Normal rate and regular rhythm.   Pulmonary:      Effort: Pulmonary effort is normal.      Breath sounds: Normal breath sounds. No wheezing or rhonchi.   Abdominal:      General: Abdomen is flat. Bowel sounds are normal. There is distension.      Palpations: Abdomen is soft. There is no hepatomegaly.      Tenderness: There is no abdominal tenderness. There is no guarding or rebound. Negative signs include Katz's sign.      Hernia: No hernia is present.   Musculoskeletal:         General: Normal range of motion.   Skin:     General: Skin is warm and dry.   Neurological:      General: No focal deficit present.      Mental Status: She is alert and oriented to person, place, and time.   Psychiatric:         Mood and Affect: Mood normal.          Behavior: Behavior normal.           Lab Results   Component Value Date    WBC 8.3 02/21/2024    WBC 10.1 02/20/2024    WBC 12.0 (H) 02/19/2024    HGB 10.8 (L) 02/21/2024    HGB 10.3 (L) 02/20/2024    HGB 10.9 (L) 02/19/2024    HCT 31.4 (L) 02/21/2024    HCT 32.4 (L) 02/20/2024    HCT 35.3 (L) 02/19/2024     02/21/2024     02/20/2024     02/19/2024     Lab Results   Component Value Date     (L) 02/21/2024     (L) 02/20/2024     (L) 02/19/2024    K 3.5 02/21/2024    K 3.6 02/20/2024    K 4.0 02/19/2024     02/21/2024     02/20/2024     02/19/2024    CO2 22 02/21/2024    CO2 24 02/20/2024    CO2 22 02/19/2024    BUN 19 02/21/2024    BUN 21 02/20/2024    BUN 22 02/19/2024    CREATININE 0.92 02/21/2024    CREATININE 1.10 (H) 02/20/2024    CREATININE 1.01 02/19/2024    CALCIUM 8.1 (L) 02/21/2024    CALCIUM 7.8 (L) 02/20/2024    CALCIUM 7.9 (L) 02/19/2024    PROT 6.6 02/17/2024    PROT 6.5 03/15/2019    PROT 5.2 (L) 11/21/2018    BILITOT 0.4 02/17/2024    BILITOT 0.3 03/15/2019    BILITOT 0.3 11/21/2018    ALKPHOS 73 02/17/2024    ALKPHOS 53 03/15/2019    ALKPHOS 46 11/21/2018    ALT 11 02/17/2024    ALT 7 03/15/2019    ALT 8 11/21/2018    AST 11 02/17/2024    AST 8 (L) 03/15/2019    AST 8 (L) 11/21/2018    GLUCOSE 175 (H) 02/21/2024    GLUCOSE 168 (H) 02/20/2024    GLUCOSE 180 (H) 02/19/2024           Caitlyn Juan is a 84 y.o. female who presents to GI clinic for liquid stool.    Chronic constipation with overflow  Symptoms most consistent with constipation and overflow diarrhea.  Less likely infectious stool, consider microscopic colitis  -Magnesium citrate cleanse followed by daily MiraLAX  -Consider Anusol suppositories if patient notices rectal bleeding again  -Recommended patient decrease caffeine and increase water intake, increase activity         Bridgett Andrade, APRN-CNP

## 2024-08-20 ENCOUNTER — TELEPHONE (OUTPATIENT)
Dept: GASTROENTEROLOGY | Facility: CLINIC | Age: 84
End: 2024-08-20
Payer: COMMERCIAL

## 2024-09-04 ENCOUNTER — OFFICE VISIT (OUTPATIENT)
Dept: GASTROENTEROLOGY | Facility: CLINIC | Age: 84
End: 2024-09-04
Payer: MEDICARE

## 2024-09-04 VITALS — HEIGHT: 64 IN | BODY MASS INDEX: 25.27 KG/M2 | OXYGEN SATURATION: 98 % | WEIGHT: 148 LBS | HEART RATE: 64 BPM

## 2024-09-04 DIAGNOSIS — K64.8 INTERNAL BLEEDING HEMORRHOIDS: ICD-10-CM

## 2024-09-04 DIAGNOSIS — K59.09 CHRONIC CONSTIPATION WITH OVERFLOW: Primary | ICD-10-CM

## 2024-09-04 PROCEDURE — 1159F MED LIST DOCD IN RCRD: CPT

## 2024-09-04 PROCEDURE — 1036F TOBACCO NON-USER: CPT

## 2024-09-04 PROCEDURE — 99213 OFFICE O/P EST LOW 20 MIN: CPT

## 2024-09-04 RX ORDER — HYDROCORTISONE ACETATE 25 MG/1
25 SUPPOSITORY RECTAL DAILY
Qty: 14 SUPPOSITORY | Refills: 1 | Status: SHIPPED | OUTPATIENT
Start: 2024-09-04 | End: 2025-09-04

## 2024-09-04 RX ORDER — POLYETHYLENE GLYCOL 3350, SODIUM SULFATE ANHYDROUS, SODIUM BICARBONATE, SODIUM CHLORIDE, POTASSIUM CHLORIDE 236; 22.74; 6.74; 5.86; 2.97 G/4L; G/4L; G/4L; G/4L; G/4L
POWDER, FOR SOLUTION ORAL
Qty: 4000 ML | Refills: 0 | Status: SHIPPED | OUTPATIENT
Start: 2024-09-04

## 2024-09-04 ASSESSMENT — ENCOUNTER SYMPTOMS
DIARRHEA: 1
ANAL BLEEDING: 0
BLOOD IN STOOL: 0
ABDOMINAL DISTENTION: 1
COUGH: 0
FEVER: 0
CHILLS: 0
TROUBLE SWALLOWING: 0
FATIGUE: 0
VOMITING: 0
RECTAL PAIN: 0
SHORTNESS OF BREATH: 0
APPETITE CHANGE: 0
CONSTIPATION: 0
NAUSEA: 0
ABDOMINAL PAIN: 0

## 2024-09-04 NOTE — PROGRESS NOTES
Subjective     History of Present Illness:   Caitlyn Juan is a 84 y.o. female with PMHx of asthma, GERD, diabetes, left breast cancer, stroke, A-fib on Eliquis, HTN, IBS-D who presents to GI clinic for follow up  Last seen 8/2024 for chronic constipation with overflow diarrhea, mag citrate cleanse followed by daily MiraLAX, consider Anusol suppositories if rectal bleeding persists, decrease caffeine and increase water intake.  Consider colonoscopy for microscopic colitis    Today, patient states mag citrate slightly cleaned her, she had some formed stools intermittently.  Has been taking MiraLAX every day now back to just liquid stools.  Has pink blood streaked toilet paper every time she moves bowels still.  Abdominal bloating has decreased slightly.  She denies any other GI complaints today.  Denies constipation, dyspepsia, melena, hematochezia, dysphagia, unintentional weight loss  2/2024 CT and x-ray of abdomen suggest constipation    Denies ETOH, smoking, marijuana  Denies fxh GI cancer or IBD  Abdominal Surgeries: john    Last colonoscopy 1/2015 for WANDA: Diverticulosis, internal hemorrhoids  Last EGD 1/2015 for WANDA: Hemorrhagic gastritis and erosions, gastric polyps.  Repeat 1 year      Past Medical History  As per HPI.     Social History  she  reports that she has never smoked. She has never used smokeless tobacco.     Family History  her family history is not on file.     Review of Systems  Review of Systems   Constitutional:  Negative for appetite change, chills, fatigue and fever.   HENT:  Negative for trouble swallowing.    Respiratory:  Negative for cough and shortness of breath.    Gastrointestinal:  Positive for abdominal distention and diarrhea. Negative for abdominal pain, anal bleeding, blood in stool, constipation, nausea, rectal pain and vomiting.       Allergies  Allergies   Allergen Reactions    Bactrim [Sulfamethoxazole-Trimethoprim] Unknown    Flagyl [Metronidazole] Unknown        Medications  Current Outpatient Medications   Medication Instructions    amLODIPine (NORVASC) 10 mg, oral, Daily    apixaban (ELIQUIS) 5 mg, oral, Every 12 hours    atorvastatin (LIPITOR) 40 mg, oral, Nightly    budesonide-formoteroL (Symbicort) 160-4.5 mcg/actuation inhaler 2 puffs, inhalation, 2 times daily RT, Rinse mouth with water after use to reduce aftertaste and incidence of candidiasis. Do not swallow.    escitalopram (LEXAPRO) 20 mg, oral, Daily    fluticasone (Flonase) 50 mcg/actuation nasal spray 1 spray, Each Nostril, Daily, Shake gently. Before first use, prime pump. After use, clean tip and replace cap.    hydrocortisone (ANUSOL-HC) 25 mg, rectal, Daily, Take prior to procedure as directed    loratadine (CLARITIN REDITABS) 10 mg, oral, Daily    LORazepam (ATIVAN) 1 mg, oral, Nightly PRN    losartan (COZAAR) 100 mg, oral, Daily    metoprolol tartrate (LOPRESSOR) 25 mg, oral, 2 times daily    polyethylene glycol (Golytely) 236-22.74-6.74 -5.86 gram solution Drink over 24-48 hours to cleanse bowels.    polyethylene glycol (MIRALAX) 17 g, oral, Daily, Mix 1 capful in 8 oz of liquid        Objective   Visit Vitals  Pulse 64      Physical Exam      Lab Results   Component Value Date    WBC 8.3 02/21/2024    WBC 10.1 02/20/2024    WBC 12.0 (H) 02/19/2024    HGB 10.8 (L) 02/21/2024    HGB 10.3 (L) 02/20/2024    HGB 10.9 (L) 02/19/2024    HCT 31.4 (L) 02/21/2024    HCT 32.4 (L) 02/20/2024    HCT 35.3 (L) 02/19/2024     02/21/2024     02/20/2024     02/19/2024     Lab Results   Component Value Date     (L) 02/21/2024     (L) 02/20/2024     (L) 02/19/2024    K 3.5 02/21/2024    K 3.6 02/20/2024    K 4.0 02/19/2024     02/21/2024     02/20/2024     02/19/2024    CO2 22 02/21/2024    CO2 24 02/20/2024    CO2 22 02/19/2024    BUN 19 02/21/2024    BUN 21 02/20/2024    BUN 22 02/19/2024    CREATININE 0.92 02/21/2024    CREATININE 1.10 (H) 02/20/2024     CREATININE 1.01 02/19/2024    CALCIUM 8.1 (L) 02/21/2024    CALCIUM 7.8 (L) 02/20/2024    CALCIUM 7.9 (L) 02/19/2024    PROT 6.6 02/17/2024    PROT 6.5 03/15/2019    PROT 5.2 (L) 11/21/2018    BILITOT 0.4 02/17/2024    BILITOT 0.3 03/15/2019    BILITOT 0.3 11/21/2018    ALKPHOS 73 02/17/2024    ALKPHOS 53 03/15/2019    ALKPHOS 46 11/21/2018    ALT 11 02/17/2024    ALT 7 03/15/2019    ALT 8 11/21/2018    AST 11 02/17/2024    AST 8 (L) 03/15/2019    AST 8 (L) 11/21/2018    GLUCOSE 175 (H) 02/21/2024    GLUCOSE 168 (H) 02/20/2024    GLUCOSE 180 (H) 02/19/2024           Caitlyn Juan is a 84 y.o. female who presents to GI clinic for liquid stool and bleeding hemorrhoids.    Internal bleeding hemorrhoids  -Start Anusol suppositories x 14 days.  May repeat as needed    Chronic constipation with overflow  -GoLytely cleanse followed by daily MiraLAX 2 days later.  Consider switching to Benefiber  -Recommended patient notify me if not better in 2 weeks           Bridgett Andrade, APRN-CNP

## 2024-09-04 NOTE — PATIENT INSTRUCTIONS
Please cleanse bowels over 24- 48 hours, then 2 days later start daily miralax  Use Anusol suppositories every night for 14 nights to shrink hemorrhoids  If not better in 2 weeks, notify me

## 2024-09-04 NOTE — ASSESSMENT & PLAN NOTE
-GoLytely cleanse followed by daily MiraLAX 2 days later.  Consider switching to Benefiber  -Recommended patient notify me if not better in 2 weeks